# Patient Record
Sex: FEMALE | Race: WHITE | NOT HISPANIC OR LATINO | Employment: FULL TIME | URBAN - NONMETROPOLITAN AREA
[De-identification: names, ages, dates, MRNs, and addresses within clinical notes are randomized per-mention and may not be internally consistent; named-entity substitution may affect disease eponyms.]

---

## 2023-03-21 LAB — THYROTROPIN (MIU/L) IN SER/PLAS BY DETECTION LIMIT <= 0.05 MIU/L: 1.65 MIU/L (ref 0.44–3.98)

## 2023-03-23 PROBLEM — E55.9 VITAMIN D DEFICIENCY: Status: ACTIVE | Noted: 2023-03-23

## 2023-03-23 PROBLEM — C73 MALIGNANT NEOPLASM OF THYROID GLAND (MULTI): Status: ACTIVE | Noted: 2023-03-23

## 2023-03-23 PROBLEM — E78.00 HYPERCHOLESTEROLEMIA: Status: ACTIVE | Noted: 2023-03-23

## 2023-03-23 PROBLEM — F41.1 GAD (GENERALIZED ANXIETY DISORDER): Status: ACTIVE | Noted: 2023-03-23

## 2023-03-23 PROBLEM — R49.0 HOARSENESS OF VOICE: Status: ACTIVE | Noted: 2023-03-23

## 2023-03-23 PROBLEM — N95.1 MENOPAUSAL SYMPTOMS: Status: ACTIVE | Noted: 2023-03-23

## 2023-03-23 PROBLEM — K21.9 GERD WITHOUT ESOPHAGITIS: Status: ACTIVE | Noted: 2023-03-23

## 2023-03-23 PROBLEM — R92.30 DENSE BREAST TISSUE: Status: ACTIVE | Noted: 2023-03-23

## 2023-03-23 PROBLEM — E66.3 OVERWEIGHT WITH BODY MASS INDEX (BMI) OF 25 TO 25.9 IN ADULT: Status: ACTIVE | Noted: 2023-03-23

## 2023-03-23 PROBLEM — C44.310 FACIAL BASAL CELL CANCER: Status: ACTIVE | Noted: 2023-03-23

## 2023-03-23 PROBLEM — H69.93 DYSFUNCTION OF BOTH EUSTACHIAN TUBES: Status: ACTIVE | Noted: 2023-03-23

## 2023-03-23 PROBLEM — N89.8 PRURITUS OF VAGINA: Status: ACTIVE | Noted: 2023-03-23

## 2023-03-23 RX ORDER — ATORVASTATIN CALCIUM 20 MG/1
20 TABLET, FILM COATED ORAL DAILY
COMMUNITY
End: 2023-07-27 | Stop reason: ALTCHOICE

## 2023-03-23 RX ORDER — LEVOTHYROXINE SODIUM 125 UG/1
1 TABLET ORAL DAILY
COMMUNITY
Start: 2019-05-02 | End: 2023-11-08 | Stop reason: WASHOUT

## 2023-03-23 RX ORDER — CITALOPRAM 10 MG/1
1 TABLET ORAL DAILY
COMMUNITY
Start: 2018-11-27 | End: 2023-10-30 | Stop reason: SDUPTHER

## 2023-03-23 RX ORDER — OMEPRAZOLE 40 MG/1
1 CAPSULE, DELAYED RELEASE ORAL
COMMUNITY
Start: 2018-07-10 | End: 2023-07-28 | Stop reason: SDUPTHER

## 2023-03-23 RX ORDER — CHOLECALCIFEROL (VITAMIN D3) 50 MCG
2 TABLET ORAL DAILY
COMMUNITY
Start: 2020-11-03 | End: 2024-03-26 | Stop reason: WASHOUT

## 2023-03-24 ENCOUNTER — OFFICE VISIT (OUTPATIENT)
Dept: PRIMARY CARE | Facility: CLINIC | Age: 53
End: 2023-03-24
Payer: COMMERCIAL

## 2023-03-24 ENCOUNTER — LAB (OUTPATIENT)
Dept: LAB | Facility: LAB | Age: 53
End: 2023-03-24
Payer: COMMERCIAL

## 2023-03-24 VITALS
OXYGEN SATURATION: 94 % | RESPIRATION RATE: 18 BRPM | BODY MASS INDEX: 26.35 KG/M2 | HEIGHT: 62 IN | HEART RATE: 80 BPM | DIASTOLIC BLOOD PRESSURE: 104 MMHG | WEIGHT: 143.2 LBS | SYSTOLIC BLOOD PRESSURE: 162 MMHG

## 2023-03-24 DIAGNOSIS — R20.0 NUMBNESS OF LEFT HAND: ICD-10-CM

## 2023-03-24 DIAGNOSIS — R07.9 CHEST PAIN, UNSPECIFIED TYPE: ICD-10-CM

## 2023-03-24 DIAGNOSIS — I10 HYPERTENSION, UNSPECIFIED TYPE: ICD-10-CM

## 2023-03-24 DIAGNOSIS — R94.31 ABNORMAL EKG: ICD-10-CM

## 2023-03-24 LAB
ALANINE AMINOTRANSFERASE (SGPT) (U/L) IN SER/PLAS: 19 U/L (ref 7–45)
ALBUMIN (G/DL) IN SER/PLAS: 4.3 G/DL (ref 3.4–5)
ALKALINE PHOSPHATASE (U/L) IN SER/PLAS: 69 U/L (ref 33–110)
ANION GAP IN SER/PLAS: 9 MMOL/L (ref 10–20)
ASPARTATE AMINOTRANSFERASE (SGOT) (U/L) IN SER/PLAS: 20 U/L (ref 9–39)
BASOPHILS (10*3/UL) IN BLOOD BY AUTOMATED COUNT: 0.07 X10E9/L (ref 0–0.1)
BASOPHILS/100 LEUKOCYTES IN BLOOD BY AUTOMATED COUNT: 1.2 % (ref 0–2)
BILIRUBIN TOTAL (MG/DL) IN SER/PLAS: 0.5 MG/DL (ref 0–1.2)
CALCIUM (MG/DL) IN SER/PLAS: 8.9 MG/DL (ref 8.6–10.3)
CARBON DIOXIDE, TOTAL (MMOL/L) IN SER/PLAS: 31 MMOL/L (ref 21–32)
CHLORIDE (MMOL/L) IN SER/PLAS: 102 MMOL/L (ref 98–107)
CREATININE (MG/DL) IN SER/PLAS: 0.65 MG/DL (ref 0.5–1.05)
EOSINOPHILS (10*3/UL) IN BLOOD BY AUTOMATED COUNT: 0.06 X10E9/L (ref 0–0.7)
EOSINOPHILS/100 LEUKOCYTES IN BLOOD BY AUTOMATED COUNT: 1 % (ref 0–6)
ERYTHROCYTE DISTRIBUTION WIDTH (RATIO) BY AUTOMATED COUNT: 12.1 % (ref 11.5–14.5)
ERYTHROCYTE MEAN CORPUSCULAR HEMOGLOBIN CONCENTRATION (G/DL) BY AUTOMATED: 32.3 G/DL (ref 32–36)
ERYTHROCYTE MEAN CORPUSCULAR VOLUME (FL) BY AUTOMATED COUNT: 96 FL (ref 80–100)
ERYTHROCYTES (10*6/UL) IN BLOOD BY AUTOMATED COUNT: 4.53 X10E12/L (ref 4–5.2)
ESTRADIOL (PG/ML) IN SER/PLAS: 65 PG/ML
FOLLITROPIN (IU/L) IN SER/PLAS: 69.4 IU/L
GFR FEMALE: >90 ML/MIN/1.73M2
GLUCOSE (MG/DL) IN SER/PLAS: 97 MG/DL (ref 74–99)
HEMATOCRIT (%) IN BLOOD BY AUTOMATED COUNT: 43.3 % (ref 36–46)
HEMOGLOBIN (G/DL) IN BLOOD: 14 G/DL (ref 12–16)
IMMATURE GRANULOCYTES/100 LEUKOCYTES IN BLOOD BY AUTOMATED COUNT: 0.2 % (ref 0–0.9)
LEUKOCYTES (10*3/UL) IN BLOOD BY AUTOMATED COUNT: 5.9 X10E9/L (ref 4.4–11.3)
LYMPHOCYTES (10*3/UL) IN BLOOD BY AUTOMATED COUNT: 1.91 X10E9/L (ref 1.2–4.8)
LYMPHOCYTES/100 LEUKOCYTES IN BLOOD BY AUTOMATED COUNT: 32.4 % (ref 13–44)
MONOCYTES (10*3/UL) IN BLOOD BY AUTOMATED COUNT: 0.54 X10E9/L (ref 0.1–1)
MONOCYTES/100 LEUKOCYTES IN BLOOD BY AUTOMATED COUNT: 9.2 % (ref 2–10)
NEUTROPHILS (10*3/UL) IN BLOOD BY AUTOMATED COUNT: 3.3 X10E9/L (ref 1.2–7.7)
NEUTROPHILS/100 LEUKOCYTES IN BLOOD BY AUTOMATED COUNT: 56 % (ref 40–80)
PLATELETS (10*3/UL) IN BLOOD AUTOMATED COUNT: 341 X10E9/L (ref 150–450)
POTASSIUM (MMOL/L) IN SER/PLAS: 3.5 MMOL/L (ref 3.5–5.3)
PROTEIN TOTAL: 7 G/DL (ref 6.4–8.2)
SODIUM (MMOL/L) IN SER/PLAS: 138 MMOL/L (ref 136–145)
TROPONIN I, HIGH SENSITIVITY: 5 NG/L (ref 0–13)
UREA NITROGEN (MG/DL) IN SER/PLAS: 9 MG/DL (ref 6–23)

## 2023-03-24 PROCEDURE — 93000 ELECTROCARDIOGRAM COMPLETE: CPT | Performed by: INTERNAL MEDICINE

## 2023-03-24 PROCEDURE — 3080F DIAST BP >= 90 MM HG: CPT | Performed by: INTERNAL MEDICINE

## 2023-03-24 PROCEDURE — 3077F SYST BP >= 140 MM HG: CPT | Performed by: INTERNAL MEDICINE

## 2023-03-24 PROCEDURE — 36415 COLL VENOUS BLD VENIPUNCTURE: CPT

## 2023-03-24 PROCEDURE — 85025 COMPLETE CBC W/AUTO DIFF WBC: CPT

## 2023-03-24 PROCEDURE — 1036F TOBACCO NON-USER: CPT | Performed by: INTERNAL MEDICINE

## 2023-03-24 PROCEDURE — 82607 VITAMIN B-12: CPT

## 2023-03-24 PROCEDURE — 80053 COMPREHEN METABOLIC PANEL: CPT

## 2023-03-24 PROCEDURE — 99214 OFFICE O/P EST MOD 30 MIN: CPT | Performed by: INTERNAL MEDICINE

## 2023-03-24 PROCEDURE — 84484 ASSAY OF TROPONIN QUANT: CPT

## 2023-03-24 RX ORDER — CARVEDILOL 12.5 MG/1
12.5 TABLET ORAL
Qty: 180 TABLET | Refills: 0 | Status: SHIPPED | OUTPATIENT
Start: 2023-03-24 | End: 2023-07-03 | Stop reason: SDUPTHER

## 2023-03-24 ASSESSMENT — PAIN SCALES - GENERAL: PAINLEVEL: 0-NO PAIN

## 2023-03-24 NOTE — PROGRESS NOTES
Subjective   Patient ID:   Jessica Francois is a 52 y.o. female who presents to the office today for Hypertension (BP was high yesterday at GYN visit, has been having frequent headaches, hot flashes that also cause red spots on knees that looked like when you get sunburn, trouble sleeping, wakes up with hands feeling numb, feels pressure in neck).  - over the past week she had squeezing in her neck, flushing, headaches, dizziness  - thought maybe she was going trough menopause or that her thyroid was off  - her blood pressure was elevated yesterday at GYN yesterday and she advised for her to come to PCP  - numb fingers on the left side when she wakes up   - had a stress test a long time ago and it was -ve. This was ordered due to chest pain on and off.   - has some engorgement of left neck   - had thyroidectomy in 2019.   - advised to start coreg 12.5mg BID  - get CT of neck     Review of Systems  12 point review of systems negative unless stated above in HPI    Vitals:    03/24/23 1128   BP: (!) 162/104   Pulse: 80   Resp: 18   SpO2: 94%     Current Outpatient Medications on File Prior to Visit   Medication Sig Dispense Refill   • cholecalciferol (Vitamin D-3) 50 MCG (2000 UT) tablet Take 2 tablets (100 mcg) by mouth once daily.     • citalopram (CeleXA) 10 mg tablet Take 1 tablet (10 mg) by mouth once daily.     • levothyroxine (Synthroid, Levoxyl) 125 mcg tablet Take 1 tablet (125 mcg) by mouth once daily.     • atorvastatin (Lipitor) 20 mg tablet Take 1 tablet (20 mg) by mouth once daily.     • omeprazole (PriLOSEC) 40 mg DR capsule Take 1 capsule (40 mg) by mouth once daily in the evening.  Take before meals. 30 min before dinner       No current facility-administered medications on file prior to visit.      Physical Exam  General: Alert and oriented, well nourished, no acute distress.  Lungs: Clear to auscultation, non-labored respiration.  Heart: Normal rate, regular rhythm, no murmur, gallop or  edema.  Neurologic: Awake, alert, and oriented X3, CN II-XII intact.  Psychiatric: Cooperative, appropriate mood and affect.    Assessment/Plan   Chest Tightness, most likely 2/2 Uncontrolled Hypertension   - BP was high yesterday at GYN visit, has been having frequent headaches, hot flashes that also cause red spots on knees that looked like when you get sunburn, trouble sleeping, wakes up with hands feeling numb, feels pressure in neck over the past week she had squeezing in her neck, flushing, headaches, dizziness  - thought maybe she was going trough menopause or that her thyroid was off  - numb fingers on the left side when she wakes up   - had a stress test a long time ago and it was -ve. This was ordered due to chest pain on and off.   - has some engorgement of left neck   - had thyroidectomy in 2019.   - advised to start coreg 12.5mg BID for the bp  - check troponin, CBC, CMP, vitamin b12  - get CT of neck to r/o anything acute  - we will call you with the results  - if anything worsens, ER  ------  Written by Caity Gaspar LPN, acting as a scribe for Dr. Chapa. This note accurately reflects the work and decisions made by Dr. Chapa.     I, Dr. Chapa, attest all medical record entries made by the scribe were under my direction and were personally dictated by me. I have reviewed the chart and agree that the record accurately reflects my performance of the history, physical exam, and assessment and plan.

## 2023-03-25 LAB — COBALAMIN (VITAMIN B12) (PG/ML) IN SER/PLAS: 527 PG/ML (ref 211–911)

## 2023-05-10 DIAGNOSIS — J34.9 SINUS PROBLEM: ICD-10-CM

## 2023-05-10 RX ORDER — AMOXICILLIN AND CLAVULANATE POTASSIUM 875; 125 MG/1; MG/1
875 TABLET, FILM COATED ORAL 2 TIMES DAILY
Qty: 20 TABLET | Refills: 0 | Status: SHIPPED | OUTPATIENT
Start: 2023-05-10 | End: 2023-05-20

## 2023-06-07 ENCOUNTER — TELEPHONE (OUTPATIENT)
Dept: PRIMARY CARE | Facility: CLINIC | Age: 53
End: 2023-06-07
Payer: COMMERCIAL

## 2023-06-07 DIAGNOSIS — R07.81 RIB PAIN: ICD-10-CM

## 2023-06-22 LAB — THYROTROPIN (MIU/L) IN SER/PLAS BY DETECTION LIMIT <= 0.05 MIU/L: 3.51 MIU/L (ref 0.44–3.98)

## 2023-06-26 LAB
THYROGLOBULIN AB (IU/ML) IN SER/PLAS: <0.9 IU/ML (ref 0–4)
THYROGLOBULIN LC-MS/MS: ABNORMAL NG/ML (ref 1.3–31.8)
THYROGLOBULIN: 0.2 NG/ML (ref 1.3–31.8)

## 2023-07-03 DIAGNOSIS — I10 HYPERTENSION, UNSPECIFIED TYPE: ICD-10-CM

## 2023-07-03 RX ORDER — CARVEDILOL 12.5 MG/1
12.5 TABLET ORAL
Qty: 180 TABLET | Refills: 0 | Status: SHIPPED | OUTPATIENT
Start: 2023-07-03 | End: 2023-09-25 | Stop reason: SDUPTHER

## 2023-07-28 ENCOUNTER — OFFICE VISIT (OUTPATIENT)
Dept: PRIMARY CARE | Facility: CLINIC | Age: 53
End: 2023-07-28
Payer: COMMERCIAL

## 2023-07-28 ENCOUNTER — APPOINTMENT (OUTPATIENT)
Dept: PRIMARY CARE | Facility: CLINIC | Age: 53
End: 2023-07-28
Payer: COMMERCIAL

## 2023-07-28 VITALS
HEIGHT: 62 IN | WEIGHT: 143 LBS | RESPIRATION RATE: 18 BRPM | SYSTOLIC BLOOD PRESSURE: 145 MMHG | DIASTOLIC BLOOD PRESSURE: 90 MMHG | HEART RATE: 64 BPM | OXYGEN SATURATION: 97 % | BODY MASS INDEX: 26.31 KG/M2

## 2023-07-28 DIAGNOSIS — K21.9 GERD WITHOUT ESOPHAGITIS: ICD-10-CM

## 2023-07-28 DIAGNOSIS — M54.10 BACK PAIN WITH RADICULOPATHY: Primary | ICD-10-CM

## 2023-07-28 PROCEDURE — 1036F TOBACCO NON-USER: CPT | Performed by: INTERNAL MEDICINE

## 2023-07-28 PROCEDURE — 99213 OFFICE O/P EST LOW 20 MIN: CPT | Performed by: INTERNAL MEDICINE

## 2023-07-28 RX ORDER — OMEPRAZOLE 40 MG/1
40 CAPSULE, DELAYED RELEASE ORAL
Qty: 90 CAPSULE | Refills: 0 | Status: SHIPPED | OUTPATIENT
Start: 2023-07-28 | End: 2023-07-28

## 2023-07-28 RX ORDER — MELOXICAM 15 MG/1
15 TABLET ORAL DAILY
Qty: 7 TABLET | Refills: 0 | Status: SHIPPED | OUTPATIENT
Start: 2023-07-28 | End: 2023-07-28

## 2023-07-28 RX ORDER — PREDNISONE 20 MG/1
40 TABLET ORAL DAILY
Qty: 10 TABLET | Refills: 0 | Status: SHIPPED | OUTPATIENT
Start: 2023-07-28 | End: 2023-07-28

## 2023-07-28 ASSESSMENT — PAIN SCALES - GENERAL: PAINLEVEL: 8

## 2023-07-28 ASSESSMENT — ENCOUNTER SYMPTOMS
BACK PAIN: 1
LEG PAIN: 1
NUMBNESS: 1

## 2023-07-28 NOTE — PROGRESS NOTES
Patient ID:   Jessica Francois is a 53 y.o. female with PMH remarkable for HTN, GERD, depression/anxiety, hypothyroidism, vitamin d deficiency who presents to the office today for Back Pain (Mostly right side but goes whole back, pain x 1 month, sharp pain in feet, flared up after slept on bad mattress, hx of bulging lumbar discs, ).    Back Pain  This is a recurrent problem. The current episode started more than 1 month ago. The problem occurs constantly. The problem has been gradually worsening since onset. The pain is present in the lumbar spine and sacro-iliac. The quality of the pain is described as aching and shooting. The pain radiates to the right foot and left foot. The pain is at a severity of 5/10. The pain is moderate. The pain is Worse during the day. The symptoms are aggravated by bending, position, twisting and lying down. Stiffness is present All day. Associated symptoms include leg pain and numbness. The treatment provided mild relief.     - start on prednisone 40mg every day x5d  - start on meloxicam 15mg every day x7d  - if no imp, will refer to PT and get imaging    REVIEW OF SYSTEMS:  Review of Systems   Musculoskeletal:  Positive for back pain.   Neurological:  Positive for numbness.   All other systems reviewed and are negative.    12 point review of systems negative unless stated above in HPI    VITAL SIGNS:  Vitals:    07/28/23 0943   BP: 145/90   Pulse: 64   Resp: 18   SpO2: 97%       ALLERGIES:  No Known Allergies     PHYSICAL EXAM:  Physical Exam  Vitals reviewed.   Constitutional:       General: She is not in acute distress.     Appearance: Normal appearance. She is not ill-appearing.   HENT:      Head: Normocephalic and atraumatic.      Right Ear: Tympanic membrane and external ear normal.      Left Ear: Tympanic membrane and external ear normal.      Nose: Nose normal.      Mouth/Throat:      Mouth: Mucous membranes are moist.      Pharynx: Oropharynx is clear.   Eyes:       Conjunctiva/sclera: Conjunctivae normal.      Pupils: Pupils are equal, round, and reactive to light.   Cardiovascular:      Rate and Rhythm: Normal rate and regular rhythm.      Heart sounds: Normal heart sounds. No murmur heard.  Pulmonary:      Effort: Pulmonary effort is normal. No respiratory distress.      Breath sounds: Normal breath sounds. No wheezing.   Abdominal:      General: There is no distension.      Palpations: Abdomen is soft. There is no mass.      Tenderness: There is no abdominal tenderness.   Musculoskeletal:         General: Normal range of motion.      Cervical back: Normal range of motion and neck supple.      Lumbar back: Tenderness present.   Skin:     General: Skin is warm and dry.   Neurological:      General: No focal deficit present.      Mental Status: She is alert and oriented to person, place, and time.      Sensory: No sensory deficit.      Motor: No weakness.      Coordination: Coordination normal.      Gait: Gait normal.   Psychiatric:         Mood and Affect: Mood normal.         Behavior: Behavior normal.         MEDICATIONS:  Current Outpatient Medications on File Prior to Visit   Medication Sig Dispense Refill    carvedilol (Coreg) 12.5 mg tablet Take 1 tablet (12.5 mg) by mouth 2 times a day with meals. 180 tablet 0    cholecalciferol (Vitamin D-3) 50 MCG (2000 UT) tablet Take 2 tablets (100 mcg) by mouth once daily.      citalopram (CeleXA) 10 mg tablet Take 1 tablet (10 mg) by mouth once daily.      levothyroxine (Synthroid, Levoxyl) 125 mcg tablet Take 1 tablet (125 mcg) by mouth once daily.      omeprazole (PriLOSEC) 40 mg DR capsule Take 1 capsule (40 mg) by mouth once daily in the evening.  Take before meals. 30 min before dinner      [DISCONTINUED] atorvastatin (Lipitor) 20 mg tablet Take 1 tablet (20 mg) by mouth once daily.       No current facility-administered medications on file prior to visit.        Your medication list            Accurate as of July 28, 2023  10:31 AM. If you have any questions, ask your nurse or doctor.                START taking these medications        Instructions Last Dose Given Next Dose Due   meloxicam 15 mg tablet  Commonly known as: Mobic  Started by: Bessie Pritchard MD      Take 1 tablet (15 mg) by mouth once daily for 7 days.       predniSONE 20 mg tablet  Commonly known as: Deltasone  Started by: Bessie Pritchard MD      Take 2 tablets (40 mg) by mouth once daily for 5 days. Take in AM.              CONTINUE taking these medications        Instructions Last Dose Given Next Dose Due   carvedilol 12.5 mg tablet  Commonly known as: Coreg      Take 1 tablet (12.5 mg) by mouth 2 times a day with meals.       cholecalciferol 50 MCG (2000 UT) tablet  Commonly known as: Vitamin D-3           citalopram 10 mg tablet  Commonly known as: CeleXA           levothyroxine 125 mcg tablet  Commonly known as: Synthroid, Levoxyl           omeprazole 40 mg DR capsule  Commonly known as: PriLOSEC                     Where to Get Your Medications        These medications were sent to [facility] [address] [address]      Phone: [phone]   meloxicam 15 mg tablet  predniSONE 20 mg tablet         RECENT LABS:  Lab Results   Component Value Date    WBC 5.9 03/24/2023    HGB 14.0 03/24/2023    HCT 43.3 03/24/2023     03/24/2023    CHOL 289 (H) 11/01/2022    TRIG 166 (H) 11/01/2022    HDL 80.0 11/01/2022    ALT 19 03/24/2023    AST 20 03/24/2023     03/24/2023    K 3.5 03/24/2023     03/24/2023    CREATININE 0.65 03/24/2023    BUN 9 03/24/2023    CO2 31 03/24/2023    TSH 3.51 06/22/2023    INR 0.9 04/15/2019    HGBA1C 4.8 10/10/2019       ASSESSMENT AND PLAN:  Assessment/Plan   Diagnoses and all orders for this visit:  Back pain with radiculopathy  -     predniSONE (Deltasone) 20 mg tablet; Take 2 tablets (40 mg) by mouth once daily for 5 days. Take in AM.  -     meloxicam (Mobic) 15 mg  tablet; Take 1 tablet (15 mg) by mouth once daily for 7 days.      ------  Written by Caity Gaspar RN, acting as a scribe for Dr. Chapa. This note accurately reflects the work and decisions made by Dr. Chapa.     I, Dr. Chapa, attest all medical record entries made by the scribe were under my direction and were personally dictated by me. I have reviewed the chart and agree that the record accurately reflects my performance of the history, physical exam, and assessment and plan.

## 2023-09-25 DIAGNOSIS — I10 HYPERTENSION, UNSPECIFIED TYPE: ICD-10-CM

## 2023-09-25 RX ORDER — CARVEDILOL 12.5 MG/1
12.5 TABLET ORAL
Qty: 180 TABLET | Refills: 0 | Status: SHIPPED | OUTPATIENT
Start: 2023-09-25 | End: 2023-09-25

## 2023-10-30 DIAGNOSIS — F41.1 GAD (GENERALIZED ANXIETY DISORDER): ICD-10-CM

## 2023-10-31 ENCOUNTER — PHARMACY VISIT (OUTPATIENT)
Dept: PHARMACY | Facility: CLINIC | Age: 53
End: 2023-10-31
Payer: COMMERCIAL

## 2023-10-31 PROCEDURE — RXMED WILLOW AMBULATORY MEDICATION CHARGE

## 2023-10-31 RX ORDER — CITALOPRAM 10 MG/1
10 TABLET ORAL DAILY
Qty: 90 TABLET | Refills: 0 | Status: SHIPPED | OUTPATIENT
Start: 2023-10-31 | End: 2024-01-22 | Stop reason: SDUPTHER

## 2023-11-01 DIAGNOSIS — K21.9 GERD WITHOUT ESOPHAGITIS: ICD-10-CM

## 2023-11-01 RX ORDER — OMEPRAZOLE 40 MG/1
CAPSULE, DELAYED RELEASE ORAL
Qty: 90 CAPSULE | Refills: 0 | Status: CANCELLED | OUTPATIENT
Start: 2023-11-01 | End: 2024-10-31

## 2023-11-02 DIAGNOSIS — K21.9 GERD WITHOUT ESOPHAGITIS: ICD-10-CM

## 2023-11-02 RX ORDER — OMEPRAZOLE 40 MG/1
CAPSULE, DELAYED RELEASE ORAL
Qty: 90 CAPSULE | Refills: 0 | Status: SHIPPED | OUTPATIENT
Start: 2023-11-02 | End: 2024-03-26 | Stop reason: WASHOUT

## 2023-11-07 ENCOUNTER — HOSPITAL ENCOUNTER (OUTPATIENT)
Dept: RADIOLOGY | Facility: HOSPITAL | Age: 53
Discharge: HOME | End: 2023-11-07
Payer: COMMERCIAL

## 2023-11-07 DIAGNOSIS — Z12.31 SCREENING MAMMOGRAM FOR BREAST CANCER: ICD-10-CM

## 2023-11-07 PROCEDURE — 77063 BREAST TOMOSYNTHESIS BI: CPT

## 2023-11-07 PROCEDURE — 77067 SCR MAMMO BI INCL CAD: CPT

## 2023-11-07 PROCEDURE — 77063 BREAST TOMOSYNTHESIS BI: CPT | Performed by: RADIOLOGY

## 2023-11-07 PROCEDURE — 77067 SCR MAMMO BI INCL CAD: CPT | Performed by: RADIOLOGY

## 2023-11-08 ENCOUNTER — OFFICE VISIT (OUTPATIENT)
Dept: DERMATOLOGY | Facility: CLINIC | Age: 53
End: 2023-11-08
Payer: COMMERCIAL

## 2023-11-08 DIAGNOSIS — L82.1 SEBORRHEIC KERATOSIS: ICD-10-CM

## 2023-11-08 DIAGNOSIS — Z85.828 HISTORY OF NONMELANOMA SKIN CANCER: ICD-10-CM

## 2023-11-08 DIAGNOSIS — D18.01 ANGIOMA OF SKIN: ICD-10-CM

## 2023-11-08 DIAGNOSIS — L90.5 SCAR CONDITIONS AND FIBROSIS OF SKIN: ICD-10-CM

## 2023-11-08 DIAGNOSIS — L81.4 LENTIGO: ICD-10-CM

## 2023-11-08 DIAGNOSIS — D22.9 BENIGN NEVUS: Primary | ICD-10-CM

## 2023-11-08 DIAGNOSIS — L57.9 SKIN CHANGES DUE TO CHRONIC EXPOSURE TO NONIONIZING RADIATION: ICD-10-CM

## 2023-11-08 PROCEDURE — 1036F TOBACCO NON-USER: CPT | Performed by: NURSE PRACTITIONER

## 2023-11-08 PROCEDURE — 99213 OFFICE O/P EST LOW 20 MIN: CPT | Performed by: NURSE PRACTITIONER

## 2023-11-08 ASSESSMENT — DERMATOLOGY QUALITY OF LIFE (QOL) ASSESSMENT
WHAT SINGLE SKIN CONDITION LISTED BELOW IS THE PATIENT ANSWERING THE QUALITY-OF-LIFE ASSESSMENT QUESTIONS ABOUT: NONE OF THE ABOVE
RATE HOW EMOTIONALLY BOTHERED YOU ARE BY YOUR SKIN PROBLEM (FOR EXAMPLE, WORRY, EMBARRASSMENT, FRUSTRATION): 0 - NEVER BOTHERED
RATE HOW BOTHERED YOU ARE BY SYMPTOMS OF YOUR SKIN PROBLEM (EG, ITCHING, STINGING BURNING, HURTING OR SKIN IRRITATION): 0 - NEVER BOTHERED
RATE HOW BOTHERED YOU ARE BY EFFECTS OF YOUR SKIN PROBLEMS ON YOUR ACTIVITIES (EG, GOING OUT, ACCOMPLISHING WHAT YOU WANT, WORK ACTIVITIES OR YOUR RELATIONSHIPS WITH OTHERS): 0 - NEVER BOTHERED
RATE HOW BOTHERED YOU ARE BY EFFECTS OF YOUR SKIN PROBLEMS ON YOUR ACTIVITIES (EG, GOING OUT, ACCOMPLISHING WHAT YOU WANT, WORK ACTIVITIES OR YOUR RELATIONSHIPS WITH OTHERS): 0 - NEVER BOTHERED
RATE HOW EMOTIONALLY BOTHERED YOU ARE BY YOUR SKIN PROBLEM (FOR EXAMPLE, WORRY, EMBARRASSMENT, FRUSTRATION): 0 - NEVER BOTHERED
RATE HOW BOTHERED YOU ARE BY SYMPTOMS OF YOUR SKIN PROBLEM (EG, ITCHING, STINGING BURNING, HURTING OR SKIN IRRITATION): 0 - NEVER BOTHERED
WHAT SINGLE SKIN CONDITION LISTED BELOW IS THE PATIENT ANSWERING THE QUALITY-OF-LIFE ASSESSMENT QUESTIONS ABOUT: NONE OF THE ABOVE

## 2023-11-08 ASSESSMENT — PATIENT GLOBAL ASSESSMENT (PGA): WHAT IS THE PGA: PATIENT GLOBAL ASSESSMENT:  1 - CLEAR

## 2023-11-08 NOTE — PATIENT INSTRUCTIONS

## 2023-11-08 NOTE — PROGRESS NOTES
Subjective     Jessica Francois is a 53 y.o. female who presents for the following: Skin Check.     Review of Systems:  No other skin or systemic complaints other than what is documented elsewhere in the note.    The following portions of the chart were reviewed this encounter and updated as appropriate:  Tobacco  Allergies  Meds  Problems  Med Hx  Surg Hx         Skin Cancer History  No skin cancer on file.      Specialty Problems          Dermatology Problems    Facial basal cell cancer        Objective   Well appearing patient in no apparent distress; mood and affect are within normal limits.    A full examination was performed including scalp, head, eyes, ears, nose, lips, neck, chest, axillae, abdomen, back, buttocks, bilateral upper extremities, bilateral lower extremities, hands, feet, fingers, toes, fingernails, and toenails. All findings within normal limits unless otherwise noted below.    Assessment/Plan   1. Benign nevus  Scattered, uniform and benign-appearing, regular brown melanocytic papules and macules.    Right upper lip has a 3 x 4.5 mm uniform tan brown macule with reticular fingerprint pattern and some brown dots. No other features noted. No vascular patterns noted.     The present appearance of the lesion is not worrisome but it should continue to be observed and testing/treatment may be warranted if change occurs.    No suspicious features noted. Advised to monitor and return to clinic if change is noted.     Related Procedures  Follow Up In Dermatology - Established Patient    2. Scar conditions and fibrosis of skin  Head - Anterior (Face)  Well healed scar at the site(s) of prior treatment with no evidence of recurrence.          The scar is clear, there is no evidence of recurrence.  The present appearance of the scar is not worrisome but it should continue to be observed and testing/treatment may be warranted if change occurs.      Related Procedures  Follow Up In Dermatology -  Established Patient    3. Angioma of skin  Scattered cherry-red papule(s).    A cherry hemangioma is a small macule (small, flat, smooth area) or papule (small, solid bump) formed from an overgrowth of tiny blood vessels in the skin. Cherry hemangiomas are characteristically red or purplish in color. They often first appear in middle adulthood and usually increase in number with age. Cherry hemangiomas are noncancerous (benign) and are common in adults.    The present appearance of the lesion is not worrisome but it should continue to be observed and testing/treatment may be warranted if change occurs.    Related Procedures  Follow Up In Dermatology - Established Patient    4. Seborrheic keratosis  Stuck on verrucous, tan-brown papules and plaques.      Seborrheic keratoses are common noncancerous (benign) growths of unknown cause seen in adults due to a thickening of an area of the top skin layer. Seborrheic keratoses may appear as if they are stuck on to the skin. They have distinct borders, and they may appear as papules (small, solid bumps) or plaques (solid, raised patches that are bigger than a thumbnail). They may be the same color as your skin, or they may be pink, light brown, darker brown, or very dark brown, or sometimes may appear black.    There is no way to prevent new seborrheic keratoses from forming. Seborrheic keratoses can be removed, but removal is considered a cosmetic issue and is usually not covered by insurance.    PLAN  No treatment is needed unless there is irritation from clothing, such as itching or bleeding.  2.   Some lotions containing alpha hydroxy acids, salicylic acid, or urea may make the areas feel smoother with regular use but will not eliminate them.    Related Procedures  Follow Up In Dermatology - Established Patient    5. Lentigo  Scattered tan macules in sun-exposed areas.    A solar lentigo (plural, solar lentigines), also known as a sun-induced freckle or senile lentigo,  is a dark (hyperpigmented) lesion caused by natural or artificial ultraviolet (UV) light. Solar lentigines may be single or multiple. This type of lentigo is different from a simple lentigo (lentigo simplex) because it is caused by exposure to UV light. Solar lentigines are benign, but they do indicate excessive sun exposure, a risk factor for the development of skin cancer.    To prevent solar lentigines, avoid exposure to sunlight in midday (10 AM to 3 PM), wear sun-protective clothing (tightly woven clothes and hats), and apply sunscreen (SPF 30 UVA and UVB block).    The present appearance of the lesion is not worrisome but it should continue to be observed and testing/treatment may be warranted if change occurs.    Related Procedures  Follow Up In Dermatology - Established Patient    6. History of nonmelanoma skin cancer    ABCDEs of melanoma and atypical moles were discussed with the patient.    Patient was instructed to perform monthly self skin examination.  We recommended that the patient have regular full skin exams given an increased risk of subsequent skin cancers.    The patient was instructed to use sun protective behaviors including use of broad spectrum sunscreens and sun protective clothing to reduce risk of skin cancers.    Warning signs of non-melanoma skin cancer discussed.    Related Procedures  Follow Up In Dermatology - Established Patient    7. Skin changes due to chronic exposure to nonionizing radiation  Actinic changes in the form of freckles, lentigines and hyper/hypopigmentation     ABCDEs of melanoma and atypical moles were discussed with the patient.    Patient was instructed to perform monthly self skin examination.  We recommended that the patient have regular full skin exams given an increased risk of subsequent skin cancers.    The patient was instructed to use sun protective behaviors including use of broad spectrum sunscreens and sun protective clothing to reduce risk of skin  cancers.    Warning signs of non-melanoma skin cancer discussed.    Related Procedures  Follow Up In Dermatology - Established Patient

## 2023-12-04 ENCOUNTER — LAB (OUTPATIENT)
Dept: LAB | Facility: LAB | Age: 53
End: 2023-12-04
Payer: COMMERCIAL

## 2023-12-04 ENCOUNTER — OFFICE VISIT (OUTPATIENT)
Dept: ENDOCRINOLOGY | Facility: CLINIC | Age: 53
End: 2023-12-04
Payer: COMMERCIAL

## 2023-12-04 VITALS — BODY MASS INDEX: 25.06 KG/M2 | WEIGHT: 137 LBS

## 2023-12-04 DIAGNOSIS — E55.9 VITAMIN D DEFICIENCY: ICD-10-CM

## 2023-12-04 DIAGNOSIS — C73 MALIGNANT NEOPLASM OF THYROID GLAND (MULTI): Primary | ICD-10-CM

## 2023-12-04 DIAGNOSIS — C73 MALIGNANT NEOPLASM OF THYROID GLAND (MULTI): ICD-10-CM

## 2023-12-04 LAB — TSH SERPL-ACNC: 0.32 MIU/L (ref 0.44–3.98)

## 2023-12-04 PROCEDURE — 1036F TOBACCO NON-USER: CPT | Performed by: INTERNAL MEDICINE

## 2023-12-04 PROCEDURE — 82306 VITAMIN D 25 HYDROXY: CPT

## 2023-12-04 PROCEDURE — 36415 COLL VENOUS BLD VENIPUNCTURE: CPT

## 2023-12-04 PROCEDURE — 99213 OFFICE O/P EST LOW 20 MIN: CPT | Performed by: INTERNAL MEDICINE

## 2023-12-04 PROCEDURE — 84443 ASSAY THYROID STIM HORMONE: CPT

## 2023-12-04 NOTE — PATIENT INSTRUCTIONS
RECOMMENDATIONS  Draw TSH, 25-OH vitamin D  Results available on [facility]  Call/message if you have not received results in 3 days.     Take levothyroxine on an empty stomach with water alone, 1 hour before eating or taking other medications, 4 hours before any calcium or iron supplement.    Follow up 6 months  Draw TSH, thyroglobulin, 25-OH vitamin D at least 1 week before next appointment

## 2023-12-04 NOTE — PROGRESS NOTES
History Of Present Illness  Jessica Francois is a 53 y.o. female with a history of thyroid cancer.     Levothyroxine 125 mcg/day  Patient is taking levothyroxine on an empty stomach with water alone.    Thyroid cancer diagnosis date: 19   Type: Papillary  Size of primary tumor: 0.3 cm, isthmus  Extrathyroidal extension (ETE): Negative  Lymph Nodes: 0/1   Distant Metastases: None known   Pathologic Staging: pT1a N0 Mx.      Surgery: Thyroidectomy (19)          Past Medical History  She has a past medical history of Allergic contact dermatitis due to plants, except food (08/15/2017), Anxiety disorder, unspecified (2013), Cellulitis, unspecified (2016), Complete or unspecified spontaneous  without complication, Encounter for screening mammogram for malignant neoplasm of breast (2017), Influenza due to other identified influenza virus with other respiratory manifestations (2017), Mild cervical dysplasia, Other mucopurulent conjunctivitis, unspecified eye (10/08/2014), Other specified postprocedural states, Papillomavirus as the cause of diseases classified elsewhere, Personal history of other complications of pregnancy, childbirth and the puerperium, Personal history of other diseases of the nervous system and sense organs (2014), Personal history of other endocrine, nutritional and metabolic disease (10/07/2019), Personal history of other infectious and parasitic diseases, Personal history of other malignant neoplasm of skin, Personal history of other specified conditions (2014), and Personal history of other specified conditions (2013).    Surgical History  She has a past surgical history that includes Cholecystectomy (2013); Tonsillectomy (2017); Hysteroscopy (2017); Cervical biopsy w/ loop electrode excision (2017); Laparoscopy diagnostic / biopsy / aspiration / lysis (2017); Colonoscopy (2017); Other surgical history  (2017);  section, classic (2017); CT angio neck (2017); CT angio head w and wo IV contrast (2017); and Breast biopsy (Left).     Social History  She reports that she has quit smoking. Her smoking use included cigarettes. She has never used smokeless tobacco. She reports current alcohol use. She reports that she does not use drugs.    Family History  Family History   Problem Relation Name Age of Onset    Diabetes Mother      Thyroid disease Mother      Heart disease Father      Diabetes Father      Bone cancer Father      Hyperthyroidism Brother      Thyroid cancer Cousin         Medications  Current Outpatient Medications   Medication Instructions    carvedilol (Coreg) 12.5 mg tablet TAKE 1 TABLET (12.5 MG) BY MOUTH 2 TIMES A DAY WITH MEALS.    cholecalciferol (Vitamin D-3) 50 MCG ( UT) tablet 2 tablets, oral, Daily    citalopram (CELEXA) 10 mg, oral, Daily    levothyroxine (Synthroid, Levoxyl) 125 mcg tablet TAKE 1 TABLET BY MOUTH ONCE DAILY    omeprazole (PriLOSEC) 40 mg DR capsule TAKE 1 CAPSULE (40 MG) BY MOUTH ONCE DAILY IN THE EVENING. TAKE BEFORE MEALS. 30 MIN BEFORE DINNER       Allergies  Patient has no known allergies.        Last Recorded Vitals  Weight 62.1 kg (137 lb).    Physical Exam  Constitutional:       General: She is not in acute distress.  HENT:      Head: Normocephalic.   Eyes:      Extraocular Movements: Extraocular movements intact.   Neurological:      Mental Status: She is alert.   Psychiatric:         Mood and Affect: Affect normal.          Relevant Results  Lab Results   Component Value Date    TSH 3.51 2023    FREET4 0.75 (L) 2018    THYROGLOBU 0.2 (L) 2023    THYROGLCMSMS Not Applicable 2023    THYROGLOBULI <0.9 2023       IMPRESSION  PAPILLARY THYROID CARCINOMA    VITAMIN D DEFICIENCY      RECOMMENDATIONS  Draw TSH, 25-OH vitamin D  Results available on [facility]  Call/message if you have not received results in 3 days.      Take levothyroxine on an empty stomach with water alone, 1 hour before eating or taking other medications, 4 hours before any calcium or iron supplement.    Follow up 6 months  Draw TSH, thyroglobulin, 25-OH vitamin D at least 1 week before next appointment

## 2023-12-05 LAB — 25(OH)D3 SERPL-MCNC: 29 NG/ML (ref 30–100)

## 2023-12-18 ENCOUNTER — LAB (OUTPATIENT)
Dept: LAB | Facility: LAB | Age: 53
End: 2023-12-18
Payer: COMMERCIAL

## 2023-12-18 ENCOUNTER — PHARMACY VISIT (OUTPATIENT)
Dept: PHARMACY | Facility: CLINIC | Age: 53
End: 2023-12-18
Payer: COMMERCIAL

## 2023-12-18 DIAGNOSIS — I10 HYPERTENSION, UNSPECIFIED TYPE: ICD-10-CM

## 2023-12-18 LAB
ALBUMIN SERPL BCP-MCNC: 4.2 G/DL (ref 3.4–5)
ALP SERPL-CCNC: 82 U/L (ref 33–110)
ALT SERPL W P-5'-P-CCNC: 14 U/L (ref 7–45)
ANION GAP SERPL CALC-SCNC: 12 MMOL/L (ref 10–20)
AST SERPL W P-5'-P-CCNC: 17 U/L (ref 9–39)
BASOPHILS # BLD AUTO: 0.04 X10*3/UL (ref 0–0.1)
BASOPHILS NFR BLD AUTO: 0.7 %
BILIRUB SERPL-MCNC: 0.3 MG/DL (ref 0–1.2)
BUN SERPL-MCNC: 15 MG/DL (ref 6–23)
CALCIUM SERPL-MCNC: 8.8 MG/DL (ref 8.6–10.3)
CARDIAC TROPONIN I PNL SERPL HS: 4 NG/L (ref 0–13)
CHLORIDE SERPL-SCNC: 103 MMOL/L (ref 98–107)
CO2 SERPL-SCNC: 27 MMOL/L (ref 21–32)
CREAT SERPL-MCNC: 0.7 MG/DL (ref 0.5–1.05)
EOSINOPHIL # BLD AUTO: 0.2 X10*3/UL (ref 0–0.7)
EOSINOPHIL NFR BLD AUTO: 3.4 %
ERYTHROCYTE [DISTWIDTH] IN BLOOD BY AUTOMATED COUNT: 12 % (ref 11.5–14.5)
GFR SERPL CREATININE-BSD FRML MDRD: >90 ML/MIN/1.73M*2
GLUCOSE SERPL-MCNC: 98 MG/DL (ref 74–99)
HCT VFR BLD AUTO: 41.7 % (ref 36–46)
HGB BLD-MCNC: 13.3 G/DL (ref 12–16)
IMM GRANULOCYTES # BLD AUTO: 0.01 X10*3/UL (ref 0–0.7)
IMM GRANULOCYTES NFR BLD AUTO: 0.2 % (ref 0–0.9)
LYMPHOCYTES # BLD AUTO: 2.48 X10*3/UL (ref 1.2–4.8)
LYMPHOCYTES NFR BLD AUTO: 41.8 %
MCH RBC QN AUTO: 31 PG (ref 26–34)
MCHC RBC AUTO-ENTMCNC: 31.9 G/DL (ref 32–36)
MCV RBC AUTO: 97 FL (ref 80–100)
MONOCYTES # BLD AUTO: 0.48 X10*3/UL (ref 0.1–1)
MONOCYTES NFR BLD AUTO: 8.1 %
NEUTROPHILS # BLD AUTO: 2.73 X10*3/UL (ref 1.2–7.7)
NEUTROPHILS NFR BLD AUTO: 45.8 %
NRBC BLD-RTO: 0 /100 WBCS (ref 0–0)
PLATELET # BLD AUTO: 311 X10*3/UL (ref 150–450)
POTASSIUM SERPL-SCNC: 3.8 MMOL/L (ref 3.5–5.3)
PROT SERPL-MCNC: 6.3 G/DL (ref 6.4–8.2)
RBC # BLD AUTO: 4.29 X10*6/UL (ref 4–5.2)
SODIUM SERPL-SCNC: 138 MMOL/L (ref 136–145)
WBC # BLD AUTO: 5.9 X10*3/UL (ref 4.4–11.3)

## 2023-12-18 PROCEDURE — 82607 VITAMIN B-12: CPT

## 2023-12-18 PROCEDURE — 36415 COLL VENOUS BLD VENIPUNCTURE: CPT

## 2023-12-18 PROCEDURE — RXMED WILLOW AMBULATORY MEDICATION CHARGE

## 2023-12-18 RX ORDER — CARVEDILOL 12.5 MG/1
TABLET ORAL
Qty: 180 TABLET | Refills: 0 | Status: SHIPPED | OUTPATIENT
Start: 2023-12-18 | End: 2024-03-11 | Stop reason: SDUPTHER

## 2023-12-19 LAB — VIT B12 SERPL-MCNC: 475 PG/ML (ref 211–911)

## 2024-01-18 ENCOUNTER — CLINICAL SUPPORT (OUTPATIENT)
Dept: PRIMARY CARE | Facility: CLINIC | Age: 54
End: 2024-01-18
Payer: COMMERCIAL

## 2024-01-18 ENCOUNTER — PHARMACY VISIT (OUTPATIENT)
Dept: PHARMACY | Facility: CLINIC | Age: 54
End: 2024-01-18
Payer: COMMERCIAL

## 2024-01-18 DIAGNOSIS — R09.81 NASAL CONGESTION: ICD-10-CM

## 2024-01-18 LAB
POC BINAX EXPIRATION: NORMAL
POC BINAX NOW COVID SERIAL NUMBER: NORMAL
POC RAPID INFLUENZA A: NEGATIVE
POC RAPID INFLUENZA B: NEGATIVE
POC SARS-COV-2 AG BINAX: NORMAL

## 2024-01-18 PROCEDURE — 87804 INFLUENZA ASSAY W/OPTIC: CPT | Performed by: INTERNAL MEDICINE

## 2024-01-18 PROCEDURE — RXMED WILLOW AMBULATORY MEDICATION CHARGE

## 2024-01-18 PROCEDURE — 87811 SARS-COV-2 COVID19 W/OPTIC: CPT | Performed by: INTERNAL MEDICINE

## 2024-01-18 RX ORDER — GUAIFENESIN 600 MG/1
600 TABLET, EXTENDED RELEASE ORAL 2 TIMES DAILY
Qty: 10 TABLET | Refills: 0 | Status: SHIPPED | OUTPATIENT
Start: 2024-01-18 | End: 2024-01-23

## 2024-01-18 RX ORDER — FLUTICASONE PROPIONATE 50 MCG
1 SPRAY, SUSPENSION (ML) NASAL DAILY
Qty: 16 G | Refills: 0 | Status: SHIPPED | OUTPATIENT
Start: 2024-01-18 | End: 2024-02-12 | Stop reason: WASHOUT

## 2024-01-18 RX ORDER — AMOXICILLIN 500 MG/1
500 TABLET, FILM COATED ORAL EVERY 8 HOURS SCHEDULED
Qty: 30 TABLET | Refills: 0 | Status: SHIPPED | OUTPATIENT
Start: 2024-01-18 | End: 2024-01-28

## 2024-01-18 NOTE — PROGRESS NOTES
Patient c/o sinus pressure and congestion under eyes, cheeks, and teeth.   Amoxicillin, mucinex and flonase called in.

## 2024-01-22 ENCOUNTER — PHARMACY VISIT (OUTPATIENT)
Dept: PHARMACY | Facility: CLINIC | Age: 54
End: 2024-01-22
Payer: COMMERCIAL

## 2024-01-22 DIAGNOSIS — F41.1 GAD (GENERALIZED ANXIETY DISORDER): ICD-10-CM

## 2024-01-22 PROCEDURE — RXMED WILLOW AMBULATORY MEDICATION CHARGE

## 2024-01-22 RX ORDER — CITALOPRAM 10 MG/1
10 TABLET ORAL DAILY
Qty: 90 TABLET | Refills: 0 | Status: SHIPPED | OUTPATIENT
Start: 2024-01-22 | End: 2024-04-22 | Stop reason: SDUPTHER

## 2024-02-12 ENCOUNTER — OFFICE VISIT (OUTPATIENT)
Dept: NEUROLOGY | Facility: CLINIC | Age: 54
End: 2024-02-12
Payer: COMMERCIAL

## 2024-02-12 VITALS
HEART RATE: 91 BPM | HEIGHT: 63 IN | SYSTOLIC BLOOD PRESSURE: 140 MMHG | BODY MASS INDEX: 23.04 KG/M2 | DIASTOLIC BLOOD PRESSURE: 80 MMHG | WEIGHT: 130 LBS

## 2024-02-12 DIAGNOSIS — G56.02 CARPAL TUNNEL SYNDROME ON LEFT: ICD-10-CM

## 2024-02-12 DIAGNOSIS — G62.9 AXONAL POLYNEUROPATHY: Primary | ICD-10-CM

## 2024-02-12 PROCEDURE — RXMED WILLOW AMBULATORY MEDICATION CHARGE

## 2024-02-12 PROCEDURE — 1036F TOBACCO NON-USER: CPT | Performed by: PSYCHIATRY & NEUROLOGY

## 2024-02-12 PROCEDURE — 99204 OFFICE O/P NEW MOD 45 MIN: CPT | Performed by: PSYCHIATRY & NEUROLOGY

## 2024-02-12 RX ORDER — GABAPENTIN 300 MG/1
300 CAPSULE ORAL 3 TIMES DAILY
Qty: 30 CAPSULE | Refills: 2 | Status: SHIPPED | OUTPATIENT
Start: 2024-02-12 | End: 2024-03-26 | Stop reason: ALTCHOICE

## 2024-02-12 ASSESSMENT — ENCOUNTER SYMPTOMS
EYES NEGATIVE: 1
WEAKNESS: 0
PSYCHIATRIC NEGATIVE: 1
LIGHT-HEADEDNESS: 1
BACK PAIN: 1
MYALGIAS: 0
RESPIRATORY NEGATIVE: 1
NUMBNESS: 1
GASTROINTESTINAL NEGATIVE: 1
CARDIOVASCULAR NEGATIVE: 1
CONSTITUTIONAL NEGATIVE: 1
JOINT SWELLING: 0
HEMATOLOGIC/LYMPHATIC NEGATIVE: 1

## 2024-02-12 NOTE — PROGRESS NOTES
I saw and evaluated the patient. I personally obtained the key and critical portions of the history and physical exam or was physically present for key and critical portions performed by the resident/fellow. I reviewed the resident/fellow's documentation and discussed the patient with the resident/fellow. I agree with the resident/fellow's medical decision making as documented in the note.    Addendum to Impression:    Examination finding was suggestive of paraesthesia in a  stocking  and glove distribution       Oh Almaraz MD       No. OLAMIDE screening performed.  STOP BANG Legend: 0-2 = LOW Risk; 3-4 = INTERMEDIATE Risk; 5-8 = HIGH Risk

## 2024-02-12 NOTE — PROGRESS NOTES
Bilateral numbness of the arms     History Of Present Illness  Jessica Francois is a 53 y.o. female presenting with PMHx of HTN, GERD, depression/anxiety, hypothyroidism (thyroid cancer s/p thyroidectomy 2019), vitamin d deficiency, presenting today with complaints of bilateral arm numbness and tingling.    Patient stated that the symptom has been on going for about 2 months. Symptoms wake her up at night and are exacerbated by taking a warm shower. There is associated burning at the tips of the finger especially on contact with warm surfaces. She works with the [employer] system and states that her job involves using sitting in front of the computer and typing mostly. She denies hx of back trauma, Denies weakness of muscle groups. Symptoms have been constant and progressive. Patient stated that she is an almost everyday wine drinker. She has never been diagnosed with DM, but does say that she has a significant family hx of DM. She has never been diagnosed with a vitamin deficiency, autoimmune disorder or hx of CVA     Past Medical and Surgical History    Past Medical History:   Diagnosis Date    Allergic contact dermatitis due to plants, except food 08/15/2017    Poison ivy dermatitis    Anxiety disorder, unspecified 2013    Anxiety    Cellulitis, unspecified 2016    Cellulitis    Complete or unspecified spontaneous  without complication         Encounter for screening mammogram for malignant neoplasm of breast 2017    Visit for screening mammogram    Influenza due to other identified influenza virus with other respiratory manifestations 2017    Influenza A    Mild cervical dysplasia     Dysplasia of cervix, low grade (LISA 1)    Other mucopurulent conjunctivitis, unspecified eye 10/08/2014    Pink eye    Other specified postprocedural states     History of loop electrical excision procedure (LEEP)    Papillomavirus as the cause of diseases classified elsewhere     HPV in female     Personal history of other complications of pregnancy, childbirth and the puerperium     History of miscarriage    Personal history of other diseases of the nervous system and sense organs 2014    History of tension headache    Personal history of other endocrine, nutritional and metabolic disease 10/07/2019    History of hyperglycemia    Personal history of other infectious and parasitic diseases     History of HPV infection    Personal history of other malignant neoplasm of skin     History of basal cell carcinoma (BCC)    Personal history of other specified conditions 2014    History of headache    Personal history of other specified conditions 2013    History of fatigue          Past Surgical History:   Procedure Laterality Date    BREAST BIOPSY Left     benign    CERVICAL BIOPSY  W/ LOOP ELECTRODE EXCISION  2017    Cervical Loop Electrosurgical Excision (LEEP)     SECTION, CLASSIC  2017     Section    CHOLECYSTECTOMY  2013    Cholecystectomy Laparoscopic    COLONOSCOPY  2017    Colonoscopy (Fiberoptic)    CT ANGIO NECK  2017    CT NECK ANGIO W AND WO IV CONTRAST 2017 [facility]    CT HEAD ANGIO W AND WO IV CONTRAST  2017    CT HEAD ANGIO W AND WO IV CONTRAST 2017 [facility]    HYSTEROSCOPY  2017    Hysteroscopy With Endometrial Ablation    LAPAROSCOPY DIAGNOSTIC / BIOPSY / ASPIRATION / LYSIS  2017    Exploratory Laparoscopy    OTHER SURGICAL HISTORY  2017    Colposcopy Cervix With Biopsy(S)    TONSILLECTOMY  2017    Tonsillectomy        Social History  Social History     Tobacco Use    Smoking status: Former     Types: Cigarettes    Smokeless tobacco: Never   Substance Use Topics    Alcohol use: Yes     Comment: occasional    Drug use: Never     Allergies  Patient has no known allergies.  (Not in a hospital admission)      Review of Systems   Constitutional: Negative.    HENT: Negative.      Eyes: Negative.    Respiratory: Negative.     Cardiovascular: Negative.    Gastrointestinal: Negative.    Genitourinary: Negative.    Musculoskeletal:  Positive for back pain. Negative for gait problem, joint swelling and myalgias.   Neurological:  Positive for light-headedness and numbness. Negative for weakness.   Hematological: Negative.    Psychiatric/Behavioral: Negative.           Cardiovascular system: S1-S2 intact  Respiratory clear to auscultation bilateral on deep breathing he feels irritability on the left side of the chest.    Neurological Exam  Mental Status  Awake, alert and oriented to person, place and time. Oriented to person, place, time and situation. Recent and remote memory are intact. Speech is normal. Language is fluent with no aphasia. Fund of knowledge is appropriate for level of education.    Cranial Nerves  CN V:  Left: Facial sensation is normal on the left.    Motor  Normal muscle bulk throughout. No fasciculations present.                                               Right                     Left  Neck flexion                           5                          5  Neck extension                      5                          5   Shoulder abduction               5                          5   Shoulder adduction               5                          5   Shoulder internal rotation      5                          5  Shoulder external rotation     5                          5  Elbow flexion                         5                          5  Elbow extension                    5                          5  Wrist flexion                           5                          5  Wrist extension                      5                          5  Supination                             5                          5  Pronation                               5                          5  Finger flexion                         5                          5  Finger extension                    5  "                         5  Finger abduction                    5                          5  Thumb flexion                        5                          5  Thumb extension                   5                          5  Thumb abduction                   5                          5  Hip flexion                              5                          5  Hip extension                         5                          5  Hip abduction                         5                          5  Hip adduction                         5                          5  Knee flexion                           5                          5  Knee extension                      5                          5  Ankle inversion                      5                          5  Ankle eversion                       5                          5  Plantarflexion                         5                          5  Dorsiflexion                            5                          5  Toe flexion                             5                          5  Toe extension                        5                          5    Sensory  Pinprick abnormality: Proprioception is normal in upper and lower extremities. Left hemisensory loss.   Right: Loss of sensation in the sural, lateral cutaneous thigh and tibial nerve distribution.  Left: Loss of sensation in the sural, tibial and lateral cutaneous thigh nerve distribution. No right-sided hemispatial neglect. No left-sided hemispatial neglect.    Last Recorded Vitals  Blood pressure 140/80, pulse 91, height 1.6 m (5' 3\"), weight 59 kg (130 lb).    Relevant Results      Current Outpatient Medications:     carvedilol (Coreg) 12.5 mg tablet, TAKE 1 TABLET (12.5 MG) BY MOUTH 2 TIMES A DAY WITH MEALS., Disp: 180 tablet, Rfl: 0    cholecalciferol (Vitamin D-3) 50 MCG (2000 UT) tablet, Take 2 tablets (4,000 Units) by mouth once daily., Disp: , Rfl:     citalopram (CeleXA) 10 mg tablet, Take 1 tablet (10 mg) " by mouth once daily., Disp: 90 tablet, Rfl: 0    levothyroxine (Synthroid, Levoxyl) 125 mcg tablet, TAKE 1 TABLET BY MOUTH ONCE DAILY, Disp: 90 tablet, Rfl: 3    omeprazole (PriLOSEC) 40 mg DR capsule, TAKE 1 CAPSULE (40 MG) BY MOUTH ONCE DAILY IN THE EVENING. TAKE BEFORE MEALS. 30 MIN BEFORE DINNER, Disp: 90 capsule, Rfl: 0     Continuous medications    PRN medications, reviewed    I have personally reviewed the following imaging for brain (CT/ MR) and results and discussed in detail with the patient/care giver/family     No results found.     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   #Carpal tunnel syndrome  - Patient presented to the clinic with complaints of numbness and tingling bilateral upper extremity  - Examination finding was suggestive of paraesthesia in a cotton and glove distribution   - Patient counselled on the need to decrease and ultimately taper of EtOH use   - We will obtain a Vitamin panel  to include Vitamin E, B1,6,12, folate   - Will obtain an EMG test   - Obtain a complement assay   - Exclude paraproteinemias and Celiac dx   - Obtain CRP, ESR and CK   - Advised to monitor for food triggers to include gluten foods, lactose and also EtOH   - We will trial patient on Gabapentin 300mg tid  - Advised to taper off escitalopram while on gabapentin   - Reevaluate in 6 weeks     Patient/Family Education: Extensive time was spent educating the patient on relevant anatomy, clinical findings and imaging, as well as discussing the potential diagnoses as discussed above.  Pharmacology: as above. Exercise: I discussed the importance of maintaining a daily exercise program, including stretching and strengthening. Preventative strategies were reviewed, specifically avoidance of any exercises that exacerbate pain.Return to online virtual visit/ clinic visit for follow-up with [facility] in 2 weeks or sooner as needed.The patient expressed understanding and agreement with  the assessment and plan.  Patient encouraged to contact us should they have any questions, concerns, or any changes in symptoms. Thank you for allowing me to participate in the care of your patient.    Gisel Lawrence MD

## 2024-02-13 ENCOUNTER — PHARMACY VISIT (OUTPATIENT)
Dept: PHARMACY | Facility: CLINIC | Age: 54
End: 2024-02-13
Payer: COMMERCIAL

## 2024-02-15 PROCEDURE — 86038 ANTINUCLEAR ANTIBODIES: CPT

## 2024-02-15 PROCEDURE — 86147 CARDIOLIPIN ANTIBODY EA IG: CPT

## 2024-02-15 PROCEDURE — 86431 RHEUMATOID FACTOR QUANT: CPT

## 2024-02-15 PROCEDURE — 86160 COMPLEMENT ANTIGEN: CPT

## 2024-02-15 PROCEDURE — 82232 ASSAY OF BETA-2 PROTEIN: CPT

## 2024-02-15 PROCEDURE — 86141 C-REACTIVE PROTEIN HS: CPT

## 2024-02-15 PROCEDURE — 84155 ASSAY OF PROTEIN SERUM: CPT

## 2024-02-15 PROCEDURE — 84165 PROTEIN E-PHORESIS SERUM: CPT | Performed by: PSYCHIATRY & NEUROLOGY

## 2024-02-15 PROCEDURE — 82746 ASSAY OF FOLIC ACID SERUM: CPT

## 2024-02-15 PROCEDURE — 84165 PROTEIN E-PHORESIS SERUM: CPT

## 2024-02-15 PROCEDURE — 86376 MICROSOMAL ANTIBODY EACH: CPT

## 2024-02-15 PROCEDURE — 86235 NUCLEAR ANTIGEN ANTIBODY: CPT

## 2024-02-20 ENCOUNTER — HOSPITAL ENCOUNTER (OUTPATIENT)
Dept: NEUROLOGY | Facility: CLINIC | Age: 54
Discharge: HOME | End: 2024-02-20
Payer: COMMERCIAL

## 2024-02-20 DIAGNOSIS — G62.9 AXONAL POLYNEUROPATHY: ICD-10-CM

## 2024-02-20 PROCEDURE — 95913 NRV CNDJ TEST 13/> STUDIES: CPT | Performed by: PSYCHIATRY & NEUROLOGY

## 2024-02-20 PROCEDURE — 95886 MUSC TEST DONE W/N TEST COMP: CPT | Performed by: PSYCHIATRY & NEUROLOGY

## 2024-02-21 ENCOUNTER — APPOINTMENT (OUTPATIENT)
Dept: NEUROLOGY | Facility: CLINIC | Age: 54
End: 2024-02-21
Payer: COMMERCIAL

## 2024-03-11 ENCOUNTER — PHARMACY VISIT (OUTPATIENT)
Dept: PHARMACY | Facility: CLINIC | Age: 54
End: 2024-03-11
Payer: COMMERCIAL

## 2024-03-11 DIAGNOSIS — I10 HYPERTENSION, UNSPECIFIED TYPE: ICD-10-CM

## 2024-03-11 PROCEDURE — RXMED WILLOW AMBULATORY MEDICATION CHARGE

## 2024-03-11 RX ORDER — CARVEDILOL 12.5 MG/1
TABLET ORAL
Qty: 60 TABLET | Refills: 0 | Status: SHIPPED | OUTPATIENT
Start: 2024-03-11 | End: 2024-04-09 | Stop reason: SDUPTHER

## 2024-03-26 ENCOUNTER — OFFICE VISIT (OUTPATIENT)
Dept: NEUROLOGY | Facility: CLINIC | Age: 54
End: 2024-03-26
Payer: COMMERCIAL

## 2024-03-26 VITALS
WEIGHT: 134 LBS | HEART RATE: 73 BPM | DIASTOLIC BLOOD PRESSURE: 80 MMHG | SYSTOLIC BLOOD PRESSURE: 134 MMHG | HEIGHT: 63 IN | BODY MASS INDEX: 23.74 KG/M2

## 2024-03-26 DIAGNOSIS — G56.02 CARPAL TUNNEL SYNDROME ON LEFT: Primary | ICD-10-CM

## 2024-03-26 DIAGNOSIS — G56.03 CARPAL TUNNEL SYNDROME, BILATERAL: ICD-10-CM

## 2024-03-26 PROCEDURE — 99214 OFFICE O/P EST MOD 30 MIN: CPT

## 2024-03-26 PROCEDURE — 1036F TOBACCO NON-USER: CPT

## 2024-03-26 ASSESSMENT — VISUAL ACUITY: VA_NORMAL: 1

## 2024-03-26 NOTE — ASSESSMENT & PLAN NOTE
-Recommend trying conservative management, placed rx for volar wrist splints. Try ergonomic keyboard,mouse  -Referral for ortho hand which is good for a year if she fails conservative tx to consider injections, surgical release   -Discussed relationship between b/l CTS and amyloidosis. Pt has no hx of amyloidosis in her family known to her, no cardiac hx other than mom with a fib and self with HTN. Can consider echo in the future and if CTS surgery is done would recommend biopsy of carpal ligaments for amyloid deposits.

## 2024-03-26 NOTE — PATIENT INSTRUCTIONS
"Your EMG is consistent with carpal tunnel in both wrists, moderate. You can try ergonomic keyboard at work.  Try volar \"cock up\" wrist splints.   If you fail this conservative treatment, we can refer you to ortho hand for injections or surgery. Referral placed.   "

## 2024-03-26 NOTE — PROGRESS NOTES
Subjective     Jessica Francois is a  53 y.o. female with a past medical history of  HTN, GERD, depression/anxiety, hypothyroidism (thyroid cancer s/p thyroidectomy 2019), vitamin d deficiency who presents with   Chief Complaint   Patient presents with    Polyneuropathy     Review EMG results       Visit type: follow up visit    HPI   Last seen by Dr Almaraz 24 for bilateral arm numbness and tingling. At that time, symptoms had been going on for about 2 months, waking her up at night and worse when taking a warm shower. Dr. Almaraz ordered EMG and labs.     Pertinent results:  - EMG demonstrated moderate CTS bilaterally   - SPEP, uric acid, ferritin, vitamin E, B6, B1, MMA, folate, C4, CRP, CK, RF, ESR, TPO, C3, beta 2 microglobulin, anti SM, anticardiolipin, ACE, YOVANI/JODI, aldolase all WNL     3/26/24  - Sx are about the same   - Still waking her at night, bothers her when she drives, blow dries her hair, working at her computer. 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 Almaraz  - Mom, dad, brother all had carpal tunnel. Mom had trigger finger. Arthritis runs in the family.       Review of Systems  10 point review of systems performed and is negative except as noted in the HPI.     All other systems have been reviewed and are negative for complaint.    Past Medical History:   Diagnosis Date    Allergic contact dermatitis due to plants, except food 08/15/2017    Poison ivy dermatitis    Anxiety disorder, unspecified 2013    Anxiety    Cellulitis, unspecified 2016    Cellulitis    Complete or unspecified spontaneous  without complication         Encounter for screening mammogram for malignant neoplasm of breast 2017    Visit for screening mammogram    Influenza due to other  identified influenza virus with other respiratory manifestations 2017    Influenza A    Mild cervical dysplasia     Dysplasia of cervix, low grade (LISA 1)    Other mucopurulent conjunctivitis, unspecified eye 10/08/2014    Pink eye    Other specified postprocedural states     History of loop electrical excision procedure (LEEP)    Papillomavirus as the cause of diseases classified elsewhere     HPV in female    Personal history of other complications of pregnancy, childbirth and the puerperium     History of miscarriage    Personal history of other diseases of the nervous system and sense organs 2014    History of tension headache    Personal history of other endocrine, nutritional and metabolic disease 10/07/2019    History of hyperglycemia    Personal history of other infectious and parasitic diseases     History of HPV infection    Personal history of other malignant neoplasm of skin     History of basal cell carcinoma (BCC)    Personal history of other specified conditions 2014    History of headache    Personal history of other specified conditions 2013    History of fatigue       No relevant family history has been documented for this patient.    Past Surgical History:   Procedure Laterality Date    BREAST BIOPSY Left     benign    CERVICAL BIOPSY  W/ LOOP ELECTRODE EXCISION  2017    Cervical Loop Electrosurgical Excision (LEEP)     SECTION, CLASSIC  2017     Section    CHOLECYSTECTOMY  2013    Cholecystectomy Laparoscopic    COLONOSCOPY  2017    Colonoscopy (Fiberoptic)    CT ANGIO NECK  2017    CT NECK ANGIO W AND WO IV CONTRAST [facility]    CT HEAD ANGIO W AND WO IV CONTRAST  2017    CT HEAD ANGIO W AND WO IV CONTRAST 2017 [facility]    HYSTEROSCOPY  2017    Hysteroscopy With Endometrial Ablation    LAPAROSCOPY DIAGNOSTIC / BIOPSY / ASPIRATION / LYSIS  2017    Exploratory Laparoscopy     OTHER SURGICAL HISTORY  01/24/2017    Colposcopy Cervix With Biopsy(S)    TONSILLECTOMY  01/24/2017    Tonsillectomy       Social History     Substance and Sexual Activity   Drug Use Never     Tobacco Use: Medium Risk (3/26/2024)    Patient History     Smoking Tobacco Use: Former     Smokeless Tobacco Use: Never     Passive Exposure: Not on file     Alcohol Use: Not on file           Objective     Neurological Exam  Mental Status  Awake, alert and oriented to person, place and time. Oriented to person, place, time and situation. Recent and remote memory are intact. Speech is normal. Language is fluent with no aphasia. Attention and concentration are normal. Fund of knowledge is appropriate for level of education.    Cranial Nerves  CN II: Visual acuity is normal. Visual fields full to confrontation.  CN III, IV, VI: Extraocular movements intact bilaterally. Normal lids and orbits bilaterally. Pupils equal round and reactive to light bilaterally.  CN V: Facial sensation is normal.  CN VII: Full and symmetric facial movement.  CN VIII: Hearing is normal.  CN IX, X: Palate elevates symmetrically. Normal gag reflex.  CN XI: Shoulder shrug strength is normal.  CN XII: Tongue midline without atrophy or fasciculations.    Motor   Strength is 5/5 throughout all four extremities.    Sensory  Light touch is normal in upper and lower extremities. Pinprick abnormality: Diminished sensation of pinprick over the bilateral hands, worse in the left. Worst over the bilateral thumbs, index, and middle fingers. . Temperature is normal in upper and lower extremities. Vibration is normal in upper and lower extremities. Proprioception is normal in upper and lower extremities.     Reflexes                                            Right                      Left  Brachioradialis                    2+                         2+  Biceps                                 2+                         2+  Patellar                                " 2+                         2+  Achilles                                2+                         2+    Gait  Normal casual, toe, heel and tandem gait.           Results for orders placed or performed in visit on 12/21/17   XR LUMBAR SPINE 2-3 VIEWS    Narrative    MRN: [id number]  Patient Name: RASHEEDA RODRIGUEZ     STUDY:  SPINE, LUMBOSACRAL; 2 OR 3 VIEWS; 12/21/2017 3:05 pm     INDICATION:  Signs/Symptoms: back pain.     COMPARISON:  None.     ACCESSION NUMBER(S):  [id number]     ORDERING CLINICIAN:  JOSE R PURI     FINDINGS:  Three views of the lumbar spine demonstrate normal alignment without  evidence of fracture or subluxation. The disc spaces are well  preserved. There are no compression fractures. The sacroiliac joints,  as visualized, appear unremarkable.     IMPRESSION:  Normal three-view lumbar spine series.      Results for orders placed or performed in visit on 08/18/17   CT ANGIO NECK W AND WO IV CONTRAST    Narrative    Addendum Begins  MRN: [id number]  Patient Name: RASHEEDA RODRIGUEZ     ADDENDUM:  There is an M*Modal voice recognition error in the dictation. The 3rd  sentence in the technique portion of the report should read as  follows:     \"The patient received 80 mL of Optiray 350 contrast intravenously.\"    Addendum Ends  MRN: [id number]  Patient Name: RASHEEDA RODRIGUEZ     STUDY:  CT ANGIO NECK; NR CT ANGIO HEAD W/O-W INCLUDE POST PROC; 8/18/2017  1:40 pm     INDICATION:  Signs/Symptoms: sudden onset of dizziness/lightheadedness,  w/sensation of occipital pressure, diaphoretic.     COMPARISON:  CT of the head dated 08/18/2017     ACCESSION NUMBER(S):  [id number]; [id number]     ORDERING CLINICIAN:  VINEET MCKEON     TECHNIQUE:  Thin cut axial CT images were generated over the upper thorax, neck,  and head during the arterial passage of a full contrast bolus.  The  bolus was generated with a power injector and followed with immediate  saline flush. The patient received 580 mL of Optiray 350 " contrast  intravenously. These image data were subtracted and then used for 3-D  reconstructions.     Maximum intensity projections and shaded surface displays were  generated in multiple planes. In addition images were transferred  into a 3-D processing program and additional projections and displays  were reviewed by the interpreting physician.     FINDINGS:  The CT angiogram through the upper thorax does not include the origin  of the right brachiocephalic artery. There is no stenosis along the  origins of the left common carotid artery or left subclavian artery.  No significant stenosis is noted along the origins of the vertebral  arteries.     The CT angiogram through the neck demonstrates no significant  stenosis along the common carotid arteries, carotid bifurcations, or  cervical segments of the internal carotid arteries. There is no  significant stenosis along the cervical segments of the vertebral  arteries. There is a right dominant vertebral artery.     The CT angiogram of the head demonstrates no significant intracranial  stenosis or intracranial aneurysm. There is a small focus of likely  incidental venous vascular ectasia along the anteromedial left middle  cranial fossa as seen on axial slice 451 of 577.     IMPRESSION:  The CT angiogram through the upper thorax does not include the origin  of the right brachiocephalic artery. There is no stenosis along the  origins of the left common carotid artery or left subclavian artery.  No significant stenosis is noted along the origins of the vertebral  arteries.     The CT angiogram through the neck demonstrates no significant  stenosis along the common carotid arteries, carotid bifurcations, or  cervical segments of the internal carotid arteries. There is no  significant stenosis along the cervical segments of the vertebral  arteries. There is a right dominant vertebral artery.     The CT angiogram of the head demonstrates no significant  "intracranial  stenosis or intracranial aneurysm.     The study was interpreted at [facility].   CT HEAD WO IV CONTRAST    Narrative    MRN: [id number]  Patient Name: RASHEEDA RODRIGUEZ     STUDY:  CT HEAD WO CONTRAST; 8/18/2017 12:45 pm     INDICATION:  Signs/Symptoms: dizziness.     COMPARISON:  None     ACCESSION NUMBER(S):  [id number]     ORDERING CLINICIAN:  VINEET MCKEON     TECHNIQUE:  Noncontrast axial CT scan of head was performed.  The images were  reviewed in bone, brain, blood and soft tissue windows.     FINDINGS:  CSF Spaces: The ventricles, sulci and basal cisterns are within  normal limits.     Parenchyma:  The grey-white differentiation is intact. There is no  mass effect or midline shift. There is no extraaxial fluid  collection. There is no intracranial hemorrhage.     Calvarium: The calvarium is unremarkable.     Paranasal sinuses and mastoids: Visualized paranasal sinuses and  mastoids are clear.     IMPRESSION:  Negative unenhanced CT scan of the brain.   CT ANGIO HEAD W AND WO IV CONTRAST    Narrative    Addendum Begins  MRN: [id number]  Patient Name: RASHEEDA RODRIGUEZ     ADDENDUM:  There is an M*Modal voice recognition error in the dictation. The 3rd  sentence in the technique portion of the report should read as  follows:     \"The patient received 80 mL of Optiray 350 contrast intravenously.\"    Addendum Ends  MRN: [id number]  Patient Name: RASHEEDA RODRIGUEZ     STUDY:  CT ANGIO NECK; NR CT ANGIO HEAD W/O-W INCLUDE POST PROC; 8/18/2017  1:40 pm     INDICATION:  Signs/Symptoms: sudden onset of dizziness/lightheadedness,  w/sensation of occipital pressure, diaphoretic.     COMPARISON:  CT of the head dated 08/18/2017     ACCESSION NUMBER(S):  [id number]; [id number]     ORDERING CLINICIAN:  VINEET MCKEON     TECHNIQUE:  Thin cut axial CT images were generated over the upper thorax, neck,  and head during the arterial passage of a full contrast bolus.  The  bolus was generated with a " power injector and followed with immediate  saline flush. The patient received 580 mL of Optiray 350 contrast  intravenously. These image data were subtracted and then used for 3-D  reconstructions.     Maximum intensity projections and shaded surface displays were  generated in multiple planes. In addition images were transferred  into a 3-D processing program and additional projections and displays  were reviewed by the interpreting physician.     FINDINGS:  The CT angiogram through the upper thorax does not include the origin  of the right brachiocephalic artery. There is no stenosis along the  origins of the left common carotid artery or left subclavian artery.  No significant stenosis is noted along the origins of the vertebral  arteries.     The CT angiogram through the neck demonstrates no significant  stenosis along the common carotid arteries, carotid bifurcations, or  cervical segments of the internal carotid arteries. There is no  significant stenosis along the cervical segments of the vertebral  arteries. There is a right dominant vertebral artery.     The CT angiogram of the head demonstrates no significant intracranial  stenosis or intracranial aneurysm. There is a small focus of likely  incidental venous vascular ectasia along the anteromedial left middle  cranial fossa as seen on axial slice 451 of 577.     IMPRESSION:  The CT angiogram through the upper thorax does not include the origin  of the right brachiocephalic artery. There is no stenosis along the  origins of the left common carotid artery or left subclavian artery.  No significant stenosis is noted along the origins of the vertebral  arteries.     The CT angiogram through the neck demonstrates no significant  stenosis along the common carotid arteries, carotid bifurcations, or  cervical segments of the internal carotid arteries. There is no  significant stenosis along the cervical segments of the vertebral  arteries. There is a right  dominant vertebral artery.     The CT angiogram of the head demonstrates no significant intracranial  stenosis or intracranial aneurysm.     The study was interpreted at [facility].   Results for orders placed or performed in visit on 08/21/14   MR BRAIN WO IV CONTRAST    Narrative    MRI scan of the brain without gadolinium 8/21/2014     History: Severe headache     Technique: Multiplanar multisequence MRI scan of the brain was   obtained including diffusion weighted imaging without gadolinium     Findings: The signal intensity within the brain is normal. There is   no diffusion evidence of acute or subacute ischemic change. There is   no evidence of focal mass effect or edema. The level of the   cerebellar tonsils is normal. The ventricular system is normal.     Impression:  1. Unremarkable examination              Assessment/Plan   Problem List Items Addressed This Visit       Carpal tunnel syndrome, bilateral    Overview     Complaints of numbness in the bilateral hands which wakes her at night, worsens with driving, doing her hair since the end of 2023.   - EMG w moderate carpal tunnel syndrome bilaterally, no cervical radic or large fiber neuropathy          Current Assessment & Plan     -Recommend trying conservative management, placed rx for volar wrist splints. Try ergonomic keyboard,mouse  -Referral for ortho hand which is good for a year if she fails conservative tx to consider injections, surgical release   -Discussed relationship between b/l CTS and amyloidosis. Pt has no hx of amyloidosis in her family known to her, no cardiac hx other than mom with a fib and self with HTN. Can consider echo in the future and if CTS surgery is done would recommend biopsy of carpal ligaments for amyloid deposits.          Relevant Orders    Referral to Orthopaedic Surgery    Wrist brace cock up volar     Other Visit Diagnoses       Carpal tunnel syndrome on left    -  Primary

## 2024-04-09 DIAGNOSIS — I10 HYPERTENSION, UNSPECIFIED TYPE: ICD-10-CM

## 2024-04-09 RX ORDER — CARVEDILOL 12.5 MG/1
TABLET ORAL
Qty: 180 TABLET | Refills: 0 | Status: SHIPPED | OUTPATIENT
Start: 2024-04-09 | End: 2025-04-09

## 2024-04-10 DIAGNOSIS — K21.9 GERD WITHOUT ESOPHAGITIS: ICD-10-CM

## 2024-04-10 PROCEDURE — RXMED WILLOW AMBULATORY MEDICATION CHARGE

## 2024-04-10 RX ORDER — OMEPRAZOLE 40 MG/1
40 CAPSULE, DELAYED RELEASE ORAL
COMMUNITY
End: 2024-04-10 | Stop reason: SDUPTHER

## 2024-04-10 RX ORDER — OMEPRAZOLE 40 MG/1
40 CAPSULE, DELAYED RELEASE ORAL
Qty: 90 CAPSULE | Refills: 0 | Status: SHIPPED | OUTPATIENT
Start: 2024-04-10

## 2024-04-12 ENCOUNTER — PHARMACY VISIT (OUTPATIENT)
Dept: PHARMACY | Facility: CLINIC | Age: 54
End: 2024-04-12
Payer: COMMERCIAL

## 2024-04-22 DIAGNOSIS — F41.1 GAD (GENERALIZED ANXIETY DISORDER): ICD-10-CM

## 2024-04-22 RX ORDER — CITALOPRAM 10 MG/1
10 TABLET ORAL DAILY
Qty: 90 TABLET | Refills: 0 | Status: SHIPPED | OUTPATIENT
Start: 2024-04-22

## 2024-06-05 ENCOUNTER — LAB (OUTPATIENT)
Dept: LAB | Facility: LAB | Age: 54
End: 2024-06-05
Payer: COMMERCIAL

## 2024-06-05 DIAGNOSIS — C73 MALIGNANT NEOPLASM OF THYROID GLAND (MULTI): ICD-10-CM

## 2024-06-05 DIAGNOSIS — E55.9 VITAMIN D DEFICIENCY: ICD-10-CM

## 2024-06-05 LAB — TSH SERPL-ACNC: 1.39 MIU/L (ref 0.44–3.98)

## 2024-06-05 PROCEDURE — 86800 THYROGLOBULIN ANTIBODY: CPT

## 2024-06-05 PROCEDURE — 82306 VITAMIN D 25 HYDROXY: CPT

## 2024-06-05 PROCEDURE — 36415 COLL VENOUS BLD VENIPUNCTURE: CPT

## 2024-06-05 PROCEDURE — 84432 ASSAY OF THYROGLOBULIN: CPT

## 2024-06-06 LAB — 25(OH)D3 SERPL-MCNC: 32 NG/ML (ref 30–100)

## 2024-06-07 LAB
BILL ONLY-THYROGLOBULIN: NORMAL
THYROGLOB AB SERPL-ACNC: <0.9 IU/ML (ref 0–4)
THYROGLOB SERPL-MCNC: 0.1 NG/ML (ref 1.3–31.8)
THYROGLOB SERPL-MCNC: ABNORMAL NG/ML (ref 1.3–31.8)

## 2024-06-10 ENCOUNTER — OFFICE VISIT (OUTPATIENT)
Dept: ENDOCRINOLOGY | Facility: CLINIC | Age: 54
End: 2024-06-10
Payer: COMMERCIAL

## 2024-06-10 VITALS
SYSTOLIC BLOOD PRESSURE: 143 MMHG | BODY MASS INDEX: 23.74 KG/M2 | HEART RATE: 63 BPM | DIASTOLIC BLOOD PRESSURE: 92 MMHG | WEIGHT: 134 LBS

## 2024-06-10 DIAGNOSIS — E55.9 VITAMIN D DEFICIENCY: ICD-10-CM

## 2024-06-10 DIAGNOSIS — C73 MALIGNANT NEOPLASM OF THYROID GLAND (MULTI): Primary | ICD-10-CM

## 2024-06-10 PROCEDURE — 99213 OFFICE O/P EST LOW 20 MIN: CPT | Performed by: INTERNAL MEDICINE

## 2024-06-10 RX ORDER — VIT C/E/ZN/COPPR/LUTEIN/ZEAXAN 250MG-90MG
100 CAPSULE ORAL DAILY
COMMUNITY

## 2024-06-10 NOTE — PATIENT INSTRUCTIONS
RECOMMENDATIONS  Levothyroxine 125 mcg/day  Take levothyroxine on an empty stomach with water alone, 1 hour before eating or taking other medications, 4 hours before any calcium or iron supplement.    Vitamin D 4000 units per day    Follow up 1 year  Repeat labs 1 week before next appointment  Ultrasound neck at next appointment

## 2024-06-10 NOTE — PROGRESS NOTES
History Of Present Illness  Jessica Francois is a 53 y.o. female with a history of thyroid cancer.     Levothyroxine 125 mcg/day  Patient is taking levothyroxine on an empty stomach with water alone.     Thyroid cancer diagnosis date: 19   Type: Papillary  Size of primary tumor: 0.3 cm, isthmus  Extrathyroidal extension (ETE): Negative  Lymph Nodes: 0/1   Distant Metastases: None known   Pathologic Staging: pT1a N0 Mx.      Surgery: Thyroidectomy (19)          Past Medical History  She has a past medical history of Allergic contact dermatitis due to plants, except food (08/15/2017), Anxiety disorder, unspecified (2013), Cellulitis, unspecified (2016), Complete or unspecified spontaneous  without complication ([facility]), Encounter for screening mammogram for malignant neoplasm of breast (2017), Influenza due to other identified influenza virus with other respiratory manifestations (2017), Mild cervical dysplasia, Other mucopurulent conjunctivitis, unspecified eye (10/08/2014), Other specified postprocedural states, Papillomavirus as the cause of diseases classified elsewhere, Personal history of other complications of pregnancy, childbirth and the puerperium, Personal history of other diseases of the nervous system and sense organs (2014), Personal history of other endocrine, nutritional and metabolic disease (10/07/2019), Personal history of other infectious and parasitic diseases, Personal history of other malignant neoplasm of skin, Personal history of other specified conditions (2014), and Personal history of other specified conditions (2013).    Surgical History  She has a past surgical history that includes Cholecystectomy (2013); Tonsillectomy (2017); Hysteroscopy (2017); Cervical biopsy w/ loop electrode excision (2017); Laparoscopy diagnostic / biopsy / aspiration / lysis (2017); Colonoscopy (2017); Other surgical  history (2017);  section, classic (2017); CT angio neck (2017); CT angio head w and wo IV contrast (2017); and Breast biopsy (Left).     Social History  She reports that she has quit smoking. Her smoking use included cigarettes. She has never used smokeless tobacco. She reports current alcohol use. She reports that she does not use drugs.    Family History  Family History   Problem Relation Name Age of Onset    Diabetes Mother      Thyroid disease Mother      Heart disease Father      Diabetes Father      Bone cancer Father      Hyperthyroidism Brother      Thyroid cancer Cousin         Medications  Current Outpatient Medications   Medication Instructions    carvedilol (Coreg) 12.5 mg tablet TAKE 1 TABLET (12.5 MG) BY MOUTH 2 TIMES A DAY WITH MEALS.    citalopram (CELEXA) 10 mg, oral, Daily    levothyroxine (Synthroid, Levoxyl) 125 mcg tablet TAKE 1 TABLET BY MOUTH ONCE DAILY    omeprazole (PRILOSEC) 40 mg, oral, Daily before breakfast, Do not crush or chew.       Allergies  Patient has no known allergies.    Review of Systems   Constitutional:  Negative for fatigue, fever and unexpected weight change.   HENT:  Negative for trouble swallowing.    Eyes:  Negative for visual disturbance.   Respiratory:  Negative for shortness of breath.    Cardiovascular:  Negative for chest pain and palpitations.   Gastrointestinal:  Negative for abdominal pain, constipation, diarrhea, nausea and vomiting.   Endocrine: Negative for cold intolerance and heat intolerance.   Musculoskeletal:  Negative for neck pain.   Skin:  Negative for rash.   Neurological:  Positive for numbness. Negative for tremors, weakness and headaches.   Psychiatric/Behavioral:  The patient is not nervous/anxious.          Last Recorded Vitals  Blood pressure (!) 143/92, pulse 63, weight 60.8 kg (134 lb).    Physical Exam  Constitutional:       General: She is not in acute distress.  Neurological:      Mental Status: She is  alert.          Relevant Results  Lab Results   Component Value Date    TSH 1.39 06/05/2024    FREET4 0.75 (L) 05/21/2018    THYROGLOBU 0.1 (L) 06/05/2024    THYROGLCMSMS Not Applicable 06/05/2024    THYROGLOBULI <0.9 06/05/2024      Latest Reference Range & Units 06/05/24 15:50   Vitamin D, 25-Hydroxy, Total 30 - 100 ng/mL 32           IMPRESSION  PAPILLARY THYROID CARCINOMA     VITAMIN D DEFICIENCY       RECOMMENDATIONS  Levothyroxine 125 mcg/day  Take levothyroxine on an empty stomach with water alone, 1 hour before eating or taking other medications, 4 hours before any calcium or iron supplement.    Vitamin D 4000 units per day    Follow up 1 year  Repeat labs 1 week before next appointment  Ultrasound neck at next appointment     nontender/soft/bowel sounds normal

## 2024-06-12 RX ORDER — LEVOTHYROXINE SODIUM 125 UG/1
125 TABLET ORAL DAILY
Qty: 90 TABLET | Refills: 3 | Status: SHIPPED | OUTPATIENT
Start: 2024-06-12 | End: 2025-06-12

## 2024-06-14 ENCOUNTER — PHARMACY VISIT (OUTPATIENT)
Dept: PHARMACY | Facility: CLINIC | Age: 54
End: 2024-06-14
Payer: COMMERCIAL

## 2024-06-14 PROCEDURE — RXMED WILLOW AMBULATORY MEDICATION CHARGE

## 2024-06-24 ENCOUNTER — APPOINTMENT (OUTPATIENT)
Dept: ORTHOPEDIC SURGERY | Facility: CLINIC | Age: 54
End: 2024-06-24
Payer: COMMERCIAL

## 2024-06-24 DIAGNOSIS — G56.03 CARPAL TUNNEL SYNDROME, BILATERAL: Primary | ICD-10-CM

## 2024-06-24 PROCEDURE — 1036F TOBACCO NON-USER: CPT | Performed by: ORTHOPAEDIC SURGERY

## 2024-06-24 PROCEDURE — 99203 OFFICE O/P NEW LOW 30 MIN: CPT | Performed by: ORTHOPAEDIC SURGERY

## 2024-06-24 RX ORDER — SODIUM CHLORIDE, SODIUM LACTATE, POTASSIUM CHLORIDE, CALCIUM CHLORIDE 600; 310; 30; 20 MG/100ML; MG/100ML; MG/100ML; MG/100ML
100 INJECTION, SOLUTION INTRAVENOUS CONTINUOUS
OUTPATIENT
Start: 2024-06-24

## 2024-06-24 RX ORDER — CEFAZOLIN SODIUM 2 G/100ML
2 INJECTION, SOLUTION INTRAVENOUS ONCE
OUTPATIENT
Start: 2024-06-24 | End: 2024-06-24

## 2024-06-24 ASSESSMENT — PAIN SCALES - GENERAL: PAINLEVEL_OUTOF10: 7

## 2024-06-24 ASSESSMENT — PAIN - FUNCTIONAL ASSESSMENT: PAIN_FUNCTIONAL_ASSESSMENT: 0-10

## 2024-06-25 ASSESSMENT — ENCOUNTER SYMPTOMS
FEVER: 0
ARTHRALGIAS: 1
TROUBLE SWALLOWING: 0
SINUS PRESSURE: 0
SHORTNESS OF BREATH: 0
CHILLS: 0
EYE DISCHARGE: 0
COLOR CHANGE: 0
JOINT SWELLING: 0
WHEEZING: 0
FACIAL SWELLING: 0

## 2024-06-25 NOTE — PROGRESS NOTES
Reason for Appointment  Chief Complaint   Patient presents with    Left Wrist - Carpal Tunnel    Right Wrist - Carpal Tunnel     History of Present Illness  New patient is a 54 y.o. female here today for evaluation of bilateral hand numbness left greater than right.  She has had symptoms that have been slowly increasing for the last 6 months and symptoms are worse at night.  Recent EMG is reviewed with the report and shows moderate bilateral carpal tunnel syndrome.  He has been wearing wrist braces at night that have not helped her.  She would like to discuss surgical intervention today.    Past Medical History:   Diagnosis Date    Allergic contact dermatitis due to plants, except food 08/15/2017    Poison ivy dermatitis    Anxiety disorder, unspecified 2013    Anxiety    Cellulitis, unspecified 2016    Cellulitis    Complete or unspecified spontaneous  without complication ([facility])         Encounter for screening mammogram for malignant neoplasm of breast 2017    Visit for screening mammogram    Influenza due to other identified influenza virus with other respiratory manifestations 2017    Influenza A    Mild cervical dysplasia     Dysplasia of cervix, low grade (LISA 1)    Other mucopurulent conjunctivitis, unspecified eye 10/08/2014    Pink eye    Other specified postprocedural states     History of loop electrical excision procedure (LEEP)    Papillomavirus as the cause of diseases classified elsewhere     HPV in female    Personal history of other complications of pregnancy, childbirth and the puerperium     History of miscarriage    Personal history of other diseases of the nervous system and sense organs 2014    History of tension headache    Personal history of other endocrine, nutritional and metabolic disease 10/07/2019    History of hyperglycemia    Personal history of other infectious and parasitic diseases     History of HPV infection    Personal history of  other malignant neoplasm of skin     History of basal cell carcinoma (BCC)    Personal history of other specified conditions 2014    History of headache    Personal history of other specified conditions 2013    History of fatigue       Past Surgical History:   Procedure Laterality Date    BREAST BIOPSY Left     benign    CERVICAL BIOPSY  W/ LOOP ELECTRODE EXCISION  2017    Cervical Loop Electrosurgical Excision (LEEP)     SECTION, CLASSIC  2017     Section    CHOLECYSTECTOMY  2013    Cholecystectomy Laparoscopic    COLONOSCOPY  2017    Colonoscopy (Fiberoptic)    CT ANGIO NECK  2017    CT NECK ANGIO W AND WO IV CONTRAST 2017 [facility]    CT HEAD ANGIO W AND WO IV CONTRAST  2017    CT HEAD ANGIO W AND WO IV CONTRAST 2017 [facility]    HYSTEROSCOPY  2017    Hysteroscopy With Endometrial Ablation    LAPAROSCOPY DIAGNOSTIC / BIOPSY / ASPIRATION / LYSIS  2017    Exploratory Laparoscopy    OTHER SURGICAL HISTORY  2017    Colposcopy Cervix With Biopsy(S)    TONSILLECTOMY  2017    Tonsillectomy       Medication Documentation Review Audit       Reviewed by Genie Martines PA-C (Physician Assistant) on 24 at 0928      Medication Order Taking? Sig Documenting Provider Last Dose Status   carvedilol (Coreg) 12.5 mg tablet [id number] Yes TAKE 1 TABLET (12.5 MG) BY MOUTH 2 TIMES A DAY WITH MEALS. Bessie Pritchard MD Taking Active   cholecalciferol (Vitamin D3) 25 MCG (1000 UT) capsule [id number] Yes Take 4 capsules (100 mcg) by mouth once daily. Historical Provider, MD Taking Active   citalopram (CeleXA) 10 mg tablet [id number] Yes Take 1 tablet (10 mg) by mouth once daily. Bessie Pritchard MD Taking Active   levothyroxine (Synthroid, Levoxyl) 125 mcg tablet [id number] Yes TAKE 1 TABLET BY MOUTH ONCE DAILY Kenyon Cadet MD Taking Active   omeprazole (PriLOSEC) 40 mg DR capsule [id number] Yes Take 1 capsule  (40 mg) by mouth once daily in the morning. Take before meals. Do not crush or chew. Bessie Pritchard MD Taking Active                    No Known Allergies    Review of Systems   Constitutional:  Negative for chills and fever.   HENT:  Negative for facial swelling, sinus pressure and trouble swallowing.    Eyes:  Negative for discharge.   Respiratory:  Negative for shortness of breath and wheezing.    Cardiovascular:  Negative for chest pain.   Musculoskeletal:  Positive for arthralgias. Negative for joint swelling.   Skin:  Negative for color change and pallor.   All other systems reviewed and are negative.    Exam   On exam patient is alert, awake, no acute distress.  Head is normocephalic, no JVD, no auditory wheezes.  She has good shoulder, elbow, and wrist motion.  Mild DJD in the hands but no significant atrophy.  She has positive Tinel's over bilateral median nerves left greater than right and positive median nerve compression test left greater than right.  Good digital motion with no triggering.  Good pulses and sensation in the upper extremities.  Skin is warm and dry without ulcerations, no other swelling lymphadenopathy.    Assessment   Encounter Diagnosis   Name Primary?    Carpal tunnel syndrome, bilateral Yes       Plan   She is having classic carpal tunnel symptoms and with significant findings on EMG, we discussed operative treatment today.  Symptoms are worse in the left hand and she would like to start with this hand.  She understands the risk of surge including nerve, artery, tendon damage, infection, continued pain, need for future surgery.  She will call and schedule left carpal tunnel release    Written by Genie Xiong saw, evaluated, and treated the patient with the PA

## 2024-06-26 ENCOUNTER — PREP FOR PROCEDURE (OUTPATIENT)
Dept: ORTHOPEDIC SURGERY | Facility: HOSPITAL | Age: 54
End: 2024-06-26
Payer: COMMERCIAL

## 2024-06-26 DIAGNOSIS — G56.03 CARPAL TUNNEL SYNDROME, BILATERAL: Primary | ICD-10-CM

## 2024-07-04 DIAGNOSIS — K21.9 GERD WITHOUT ESOPHAGITIS: ICD-10-CM

## 2024-07-05 RX ORDER — OMEPRAZOLE 40 MG/1
40 CAPSULE, DELAYED RELEASE ORAL
Qty: 90 CAPSULE | Refills: 0 | Status: SHIPPED | OUTPATIENT
Start: 2024-07-05

## 2024-07-23 ENCOUNTER — PHARMACY VISIT (OUTPATIENT)
Dept: PHARMACY | Facility: CLINIC | Age: 54
End: 2024-07-23
Payer: COMMERCIAL

## 2024-07-23 PROCEDURE — RXMED WILLOW AMBULATORY MEDICATION CHARGE

## 2024-07-26 ENCOUNTER — PRE-ADMISSION TESTING (OUTPATIENT)
Dept: PREADMISSION TESTING | Facility: HOSPITAL | Age: 54
End: 2024-07-26
Payer: COMMERCIAL

## 2024-07-26 ENCOUNTER — APPOINTMENT (OUTPATIENT)
Dept: PREADMISSION TESTING | Facility: HOSPITAL | Age: 54
End: 2024-07-26
Payer: COMMERCIAL

## 2024-07-26 VITALS
HEART RATE: 65 BPM | RESPIRATION RATE: 16 BRPM | WEIGHT: 138 LBS | HEIGHT: 63 IN | OXYGEN SATURATION: 98 % | BODY MASS INDEX: 24.45 KG/M2 | DIASTOLIC BLOOD PRESSURE: 87 MMHG | SYSTOLIC BLOOD PRESSURE: 143 MMHG | TEMPERATURE: 97.9 F

## 2024-07-26 DIAGNOSIS — G56.03 CARPAL TUNNEL SYNDROME, BILATERAL: ICD-10-CM

## 2024-07-26 PROCEDURE — 99203 OFFICE O/P NEW LOW 30 MIN: CPT

## 2024-07-26 ASSESSMENT — ENCOUNTER SYMPTOMS
CONSTITUTIONAL NEGATIVE: 1
EYES NEGATIVE: 1
HEMATOLOGIC/LYMPHATIC NEGATIVE: 1
CARDIOVASCULAR NEGATIVE: 1
MUSCULOSKELETAL NEGATIVE: 1
PSYCHIATRIC NEGATIVE: 1
NUMBNESS: 1
ALLERGIC/IMMUNOLOGIC NEGATIVE: 1
GASTROINTESTINAL NEGATIVE: 1
ENDOCRINE NEGATIVE: 1
RESPIRATORY NEGATIVE: 1

## 2024-07-26 NOTE — CPM/PAT H&P
CPM/PAT Evaluation       Name: Jessica Francois (Jessica Francois)  /Age: 1970/54 y.o.     In-Person       Chief Complaint: Carpel tunnel    HPI Jessica Francois is a 54 year old female in with complaints of bilateral hand numbness since the end of last year. She states the left hand is worse than the right. She stated the numbness and tingling is worse at night time. She admits to having occasional sharp pain in the wrist area. 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 She has tried using wrist braces with no relief. She denies fever, chills, nausea, vomiting, chest pain, sob, and palpitations. She is scheduled for a decompression median nerve carpel tunnel release of the left hand    Past Medical History:   Diagnosis Date    Allergic contact dermatitis due to plants, except food 08/15/2017    Poison ivy dermatitis    Anxiety disorder, unspecified 2013    Anxiety    Cellulitis, unspecified 2016    Cellulitis    Complete or unspecified spontaneous  without complication ([facility])         Encounter for screening mammogram for malignant neoplasm of breast 2017    Visit for screening mammogram    Hypertension     Hypothyroidism     Influenza due to other identified influenza virus with other respiratory manifestations 2017    Influenza A    Mild cervical dysplasia     Dysplasia of cervix, low grade (LISA 1)    Other mucopurulent conjunctivitis, unspecified eye 10/08/2014    Pink eye    Other specified postprocedural states     History of loop electrical excision procedure (LEEP)    Papillomavirus as the cause of diseases classified elsewhere     HPV in female    Personal history of other complications of pregnancy, childbirth and the puerperium     History of miscarriage    Personal history of other diseases of the nervous system and sense organs 2014    History of tension headache    Personal history of other endocrine, nutritional and metabolic disease  10/07/2019    History of hyperglycemia    Personal history of other infectious and parasitic diseases     History of HPV infection    Personal history of other malignant neoplasm of skin     History of basal cell carcinoma (BCC)    Personal history of other specified conditions 2014    History of headache    Personal history of other specified conditions 2013    History of fatigue       Past Surgical History:   Procedure Laterality Date    BREAST BIOPSY Left     benign    CERVICAL BIOPSY  W/ LOOP ELECTRODE EXCISION  2017    Cervical Loop Electrosurgical Excision (LEEP)     SECTION, CLASSIC  2017     Section    CHOLECYSTECTOMY  2013    Cholecystectomy Laparoscopic    COLONOSCOPY  2017    Colonoscopy (Fiberoptic)    CT ANGIO NECK  2017    CT NECK ANGIO W AND WO IV CONTRAST 2017 [facility]    CT HEAD ANGIO W AND WO IV CONTRAST  2017    CT HEAD ANGIO W AND WO IV CONTRAST 2017 [facility]    HYSTEROSCOPY  2017    Hysteroscopy With Endometrial Ablation    LAPAROSCOPY DIAGNOSTIC / BIOPSY / ASPIRATION / LYSIS  2017    Exploratory Laparoscopy    OTHER SURGICAL HISTORY  2017    Colposcopy Cervix With Biopsy(S)    THYROIDECTOMY      TONSILLECTOMY  2017    Tonsillectomy     Social History     Tobacco Use    Smoking status: Former     Types: Cigarettes     Start date:      Quit date:      Years since quittin.5    Smokeless tobacco: Never    Tobacco comments:     LIGHT USE   Substance Use Topics    Alcohol use: Yes     Alcohol/week: 3.0 standard drinks of alcohol     Types: 3 Standard drinks or equivalent per week     Comment: occasional     Social History     Substance and Sexual Activity   Drug Use Never           Family History   Problem Relation Name Age of Onset    Diabetes Mother      Thyroid disease Mother      Heart disease Father      Diabetes Father      Bone cancer Father       Hyperthyroidism Brother      Thyroid cancer Cousin         No Known Allergies    Current Outpatient Medications   Medication Sig Dispense Refill    carvedilol (Coreg) 12.5 mg tablet TAKE 1 TABLET (12.5 MG) BY MOUTH 2 TIMES A DAY WITH MEALS. 180 tablet 0    cholecalciferol (Vitamin D3) 25 MCG (1000 UT) capsule Take 4 capsules (100 mcg) by mouth once daily.      citalopram (CeleXA) 10 mg tablet Take 1 tablet (10 mg) by mouth once daily. 90 tablet 0    levothyroxine (Synthroid, Levoxyl) 125 mcg tablet TAKE 1 TABLET BY MOUTH ONCE DAILY 90 tablet 3    omeprazole (PriLOSEC) 40 mg DR capsule Take 1 capsule (40 mg) by mouth once daily in the morning. Take before meals. Do not crush or chew. (Patient taking differently: Take 1 capsule (40 mg) by mouth once daily as needed. Do not crush or chew.) 90 capsule 0     No current facility-administered medications for this visit.         Review of Systems   Constitutional: Negative.    HENT: Negative.     Eyes: Negative.    Respiratory: Negative.     Cardiovascular: Negative.    Gastrointestinal: Negative.    Endocrine: Negative.    Genitourinary: Negative.    Musculoskeletal: Negative.    Skin: Negative.    Allergic/Immunologic: Negative.    Neurological:  Positive for numbness (bilateral hands and wrist).   Hematological: Negative.    Psychiatric/Behavioral: Negative.                Physical Exam  Vitals reviewed.   Constitutional:       Appearance: Normal appearance.   HENT:      Head: Normocephalic and atraumatic.      Nose: Nose normal.      Mouth/Throat:      Mouth: Mucous membranes are moist.      Pharynx: Oropharynx is clear.   Eyes:      Extraocular Movements: Extraocular movements intact.      Conjunctiva/sclera: Conjunctivae normal.      Pupils: Pupils are equal, round, and reactive to light.   Cardiovascular:      Rate and Rhythm: Normal rate and regular rhythm.      Pulses: Normal pulses.      Heart sounds: Normal heart sounds.   Pulmonary:      Effort: Pulmonary effort  "is normal.      Breath sounds: Normal breath sounds.   Abdominal:      General: Bowel sounds are normal.      Palpations: Abdomen is soft.   Genitourinary:     Comments: Assessment referred to physician  Musculoskeletal:         General: Normal range of motion.      Cervical back: Normal range of motion.   Skin:     General: Skin is warm and dry.   Neurological:      General: No focal deficit present.      Mental Status: She is alert and oriented to person, place, and time.   Psychiatric:         Mood and Affect: Mood normal.         Behavior: Behavior normal.         Thought Content: Thought content normal.         Judgment: Judgment normal.          PAT AIRWAY:   Airway:     Mallampati::  II    TM distance::  >3 FB    Neck ROM::  Full  normal      /87   Pulse 65   Temp 36.6 °C (97.9 °F) (Temporal)   Resp 16   Ht 1.6 m (5' 3\")   Wt 62.6 kg (138 lb)   SpO2 98%   BMI 24.45 kg/m²     ASA: 2  KAREN: 2.8%  RCRI: 0.4%      DASI Risk Score    No data to display       Caprini DVT Assessment    No data to display       Modified Frailty Index    No data to display       CHADS2 Stroke Risk  Current as of 33 minutes ago        N/A 3 to 100%: High Risk   2 to < 3%: Medium Risk   0 to < 2%: Low Risk     Last Change: N/A          This score determines the patient's risk of having a stroke if the patient has atrial fibrillation.        This score is not applicable to this patient. Components are not calculated.          Revised Cardiac Risk Index      Flowsheet Row Most Recent Value   Revised Cardiac Risk Calculator 0          Apfel Simplified Score    No data to display       Risk Analysis Index Results This Encounter    No data found in the last 1 encounters.       Stop Bang Score      Flowsheet Row Most Recent Value   Do you snore loudly? 0   Do you often feel tired or fatigued after your sleep? 0   Has anyone ever observed you stop breathing in your sleep? 0   Do you have or are you being treated for high blood " pressure? 1   Recent BMI (Calculated) 23.7   Is BMI greater than 35 kg/m2? 0=No   Age older than 50 years old? 1=Yes   Is your neck circumference greater than 17 inches (Male) or 16 inches (Female)? 0   Gender - Male 0=No   STOP-BANG Total Score 2            Assessment and Plan:     1.Carpal tunnel syndrome, bilateral : Decompression Median Nerve with Carpal Tunnel Release Left  2. Thyroid cancer: thyroidectomy 2019. Patient taking levothyroxine  Last TSH 1.39  6/5/24  3. HTN: Carvedilol  4. Anxiety/Depression: Leslie Cedeno, APRN-CNP

## 2024-07-26 NOTE — PREPROCEDURE INSTRUCTIONS
Why must I stop eating and drinking near surgery time?  With sedation, food or liquid in your stomach can enter your lungs causing serious complications  Increases nausea and vomiting    When do I need to stop eating and drinking before my surgery?   Do not eat or drink after midnight the night before your surgery/procedure.  You may have small sips of water to take your medication.    PAT DISCHARGE INSTRUCTIONS    Please call the Same Day Surgery (SDS) Department of the hospital where your procedure will be performed after 2:00 PM the day before your surgery. If you are scheduled on a Monday, or a Tuesday following a Monday holiday, you will need to call on the last business day prior to your surgery.      [facility]  [address]  [place] [id number]  [phone]    Please let your surgeon know if:      You develop any open sores, shingles, burning or painful urination as these may increase your risk of an infection.   You no longer wish to have the surgery.   Any other personal circumstances change that may lead to the need to cancel or defer this surgery-such as being sick or getting admitted to any hospital within one week of your planned procedure.    Your contact details change, such as a change of address or phone number.    Starting now:     Please DO NOT drink alcohol or smoke for 24 hours before surgery. It is well known that quitting smoking can make a huge difference to your health and recovery from surgery. The longer you abstain from smoking, the better your chances of a healthy recovery. If you need help with quitting, call 8-651-QUIT-NOW to be connected to a trained counselor who will discuss the best methods to help you quit.     Before your surgery:    Please stop all supplements 7 days prior to surgery. Or as directed by your surgeon.   Please stop taking NSAID pain medicine such as Advil and Motrin 7 days before surgery.    If you develop any  Physical Therapy IRP Treatment     Primary Rehabilitation Diagnosis: central cord syndrome        Planned Discharge Destination: Home     SUBJECTIVE: Subjective: \"I wanna do whatever you think I should do\" (02/01/22 1300)   Subjective/Objective Comments: B sanchez hose donmorgan during session, pt refused abdominal binder for first half of session  (02/01/22 1300)   Patient's Personal Goal: \"to get back walking, self sufficient\" \"to cut hair again\"     OBJECTIVE:  Precautions  Neck Brace: At all times (01/31/22 0745)  Cervical Precautions: Yes (01/31/22 0745)  Lumbar Precautions: Yes (01/31/22 0745)  Seizure Precautions: Yes (01/31/22 0745)  Other Precautions: Level 2 mask and full face shield worn by therapist. (02/01/22 1045)  Precautions Comments: B SANCHEZ hose donned (01/31/22 0745)        See below for current functional status overview.  See PT flowsheet for full details regarding the PT therapy provided.    ASSESSMENT:   Treatment today focused on increasing tolerance to upright, vitals monitoring, transfer training, and initiating NMR to facilitate trunk control/dyn balance/R UE motor activation.  Patient is demonstrating good progress supported by progression of SL>sit from max a to now mod a and from max a x 2 SBT to now mod a x 1 + min a x 1 squat pivot transfer.    Patient limited at this time by dec tolerance to upright requiring frequent prolonged reclined rest breaks on wedges.  Patient will benefit from further skilled PT  for continued training with all functional mobility and increasing upright tolerance to help the patient meet goal of min a.     PT Identified Barriers to Discharge: Intermittent muscle spasms, neck and RUE pain     This patient participated in all scheduled physical therapy time with this therapist today.    Education:     Education Provided  On this date, education was provided to patient regarding bed mobility and transfers.  The response to education was/were: Verbalizes understanding,  Demonstrates understanding and Needs reinforcement.      LONG TERM GOALS:    Bed Mobility Discharge Goal: Pt to perform all bed mobility mod I   Transfer Discharge Goal: Pt to perform bed<>chair transfer with LRAD at min a    PLAN:   Continue skilled PT, including the following Treatment/Interventions: Functional transfer training;Strengthening;ROM;Endurance training;Patient/Family training;Equipment eval/education;Bed mobility;Gait training;Continued evaluation;Wheelchair mobility;Stairs retraining;Safety Education;Neuromuscular re-education (01/28/22 1045)    ,                     RECOMMENDATIONS FOR DISCHARGE:  Recommendation for Discharge: PT IL: Patient requires 24 HOUR assistance to perform mobility and/or ADLs safely, Patient is appropriate for Physical Therapy 1-3 times per week, Other (comment) (home health vs OP neuro)     PT/OT Mobility Equipment for Discharge: tbd (01/28/22 1045)   PT/OT ADL Equipment for Discharge: TBD; anticipate TTB, BSC (01/31/22 0369)       FUNCTIONAL DATA OVERVIEW LAST 24 HOURS  Bed Mobility   Bed Mobility  Rolling to the Right: Supervision (Supv) (using bed rails ) (02/01/22 1300)  Supine to Sit: Moderate Assist (Mod) (R SL>sit, A for trunk leverage ) (02/01/22 1300)  Bed Mobility Comments: A/cues to facilitate log roll technique and use of L LE to assist R LE (02/01/22 1300)     Transfers  Transfers  Squat Pivot Transfers: Total Assist - Non-dependent (fluctuates mod a x 1 + min a x 1 to mod a x 1 ) (02/01/22 1300)  Transfer Comments 1: squat pivot, x 3 reps towards L, level surfaces, A/cues to facilitate trunk control, hip leverage, dyn balance (02/01/22 1300)     Gait   ,      Stairs        Wheelchair Mobility        Balance  Balance  Sitting - Static: Supervision (Supv) (sba) (02/01/22 1300)  Sitting - Dynamic: Minimal Assist (Min) (cga) (02/01/22 1300)     Neuromuscular Re-education  Neuromuscular Re-education  Neuromuscular Re-education 1: short sitting EOM, R UE supported  fever, cough, cold, rashes, cuts, scratches, scrapes, urinary symptoms or infection anywhere on your body (including teeth and gums) prior to surgery, please call your surgeon’s office as soon as possible. This may require treatment to reduce the chance of cancellation on the day of surgery.    The day before your surgery:   DIET- Please follow the diet instructions at the top of your packet.   Get a good night’s rest.  Use the special soap for bathing if you have been instructed to use one.    Scheduled surgery times may change and you will be notified if this occurs - please check your personal voicemail for any updates.     On the morning of surgery:   Wear comfortable, loose fitting clothes which open in the front. Please do not wear moisturizers, creams, lotions, makeup or perfume.    Please bring with you to surgery:   Photo ID and insurance card   Current list of medicines and allergies   Pacemaker/ Defibrillator/Heart stent cards   CPAP machine and mask    Slings/ splints/ crutches   A copy of your complete advanced directive/DHPOA.    Please do NOT bring with you to surgery:   All jewelry and valuables should be left at home.   Prosthetic devices such as contact lenses, hearing aids, dentures, eyelash extensions, hairpins and body piercings must be removed prior to going in to the surgical suite.    After outpatient surgery:   A responsible adult MUST accompany you at the time of discharge and stay with you for 24 hours after your surgery. You may NOT drive yourself home after surgery.    Do not drive, operate machinery, make critical decisions or do activities that require co-ordination or balance until after a night’s sleep.   Do not drink alcoholic beverages for 24 hours.   Instructions for resuming your medications will be provided by your surgeon.    CALL YOUR DOCTOR AFTER SURGERY IF YOU HAVE:     Chills and/or a fever of 101° F or higher.    Redness, swelling, pus or drainage from your surgical wound  on wedges/pillows performing L UE reaching towards L to facilitate R trunk activation x 4 reps with sba and mod tactile cues for mm activation  (02/01/22 1300)  Neuromuscular Re-education 2: short sitting EOM, performing R forearm pushup to midline with min a to facilitate scapular depression/tricep ext and anterior w/s x 5 reps  (02/01/22 1300)     Other Therapeutic Interventions        Additional Assessments Completed  na    At the end of the therapy session, patient is in wheelchair with the following safety measures in place: alarm system in place/re-engaged, call light within reach, equipment intact and lines intact.    Patient is located in the patient room and was handed off to RN. Patient's family was not present. .   or a bad smell from the wound.    Lightheadedness, fainting or confusion.    Persistent vomiting (throwing up) and are not able to eat or drink for 12 hours.    Three or more loose, watery bowel movements in 24 hours (diarrhea).   Difficulty or pain while urinating( after non-urological surgery)    Pain and swelling in your legs, especially if it is only on one side.    Difficulty breathing or are breathing faster than normal.    Any new concerning symptoms.     Medication List            Accurate as of July 26, 2024  2:31 PM. Always use your most recent med list.                carvedilol 12.5 mg tablet  Commonly known as: Coreg  TAKE 1 TABLET (12.5 MG) BY MOUTH 2 TIMES A DAY WITH MEALS.  Medication Adjustments for Surgery: Take morning of surgery with sip of water, no other fluids     citalopram 10 mg tablet  Commonly known as: CeleXA  Take 1 tablet (10 mg) by mouth once daily.  Notes to patient: Take evening dose before surgery.     levothyroxine 125 mcg tablet  Commonly known as: Synthroid, Levoxyl  TAKE 1 TABLET BY MOUTH ONCE DAILY  Medication Adjustments for Surgery: Take morning of surgery with sip of water, no other fluids     omeprazole 40 mg DR capsule  Commonly known as: PriLOSEC  Take 1 capsule (40 mg) by mouth once daily in the morning. Take before meals. Do not crush or chew.  Notes to patient: Take as needed.     Vitamin D3 25 MCG (1000 UT) capsule  Generic drug: cholecalciferol  Medication Adjustments for Surgery: Stop 7 days before surgery

## 2024-07-26 NOTE — H&P (VIEW-ONLY)
CPM/PAT Evaluation       Name: Jessica Francois (Jessica Francois)  /Age: 1970/54 y.o.     In-Person       Chief Complaint: Carpel tunnel    HPI Jessica Francois is a 54 year old female in with complaints of bilateral hand numbness since the end of last year. She states the left hand is worse than the right. She stated the numbness and tingling is worse at night time. She admits to having occasional sharp pain in the wrist area. 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 She has tried using wrist braces with no relief. She denies fever, chills, nausea, vomiting, chest pain, sob, and palpitations. She is scheduled for a decompression median nerve carpel tunnel release of the left hand    Past Medical History:   Diagnosis Date    Allergic contact dermatitis due to plants, except food 08/15/2017    Poison ivy dermatitis    Anxiety disorder, unspecified 2013    Anxiety    Cellulitis, unspecified 2016    Cellulitis    Complete or unspecified spontaneous  without complication ([facility])         Encounter for screening mammogram for malignant neoplasm of breast 2017    Visit for screening mammogram    Hypertension     Hypothyroidism     Influenza due to other identified influenza virus with other respiratory manifestations 2017    Influenza A    Mild cervical dysplasia     Dysplasia of cervix, low grade (LISA 1)    Other mucopurulent conjunctivitis, unspecified eye 10/08/2014    Pink eye    Other specified postprocedural states     History of loop electrical excision procedure (LEEP)    Papillomavirus as the cause of diseases classified elsewhere     HPV in female    Personal history of other complications of pregnancy, childbirth and the puerperium     History of miscarriage    Personal history of other diseases of the nervous system and sense organs 2014    History of tension headache    Personal history of other endocrine, nutritional and metabolic disease  10/07/2019    History of hyperglycemia    Personal history of other infectious and parasitic diseases     History of HPV infection    Personal history of other malignant neoplasm of skin     History of basal cell carcinoma (BCC)    Personal history of other specified conditions 2014    History of headache    Personal history of other specified conditions 2013    History of fatigue       Past Surgical History:   Procedure Laterality Date    BREAST BIOPSY Left     benign    CERVICAL BIOPSY  W/ LOOP ELECTRODE EXCISION  2017    Cervical Loop Electrosurgical Excision (LEEP)     SECTION, CLASSIC  2017     Section    CHOLECYSTECTOMY  2013    Cholecystectomy Laparoscopic    COLONOSCOPY  2017    Colonoscopy (Fiberoptic)    CT ANGIO NECK  2017    CT NECK ANGIO W AND WO IV CONTRAST 2017 [facility]    CT HEAD ANGIO W AND WO IV CONTRAST  2017    CT HEAD ANGIO W AND WO IV CONTRAST 2017 [facility]    HYSTEROSCOPY  2017    Hysteroscopy With Endometrial Ablation    LAPAROSCOPY DIAGNOSTIC / BIOPSY / ASPIRATION / LYSIS  2017    Exploratory Laparoscopy    OTHER SURGICAL HISTORY  2017    Colposcopy Cervix With Biopsy(S)    THYROIDECTOMY      TONSILLECTOMY  2017    Tonsillectomy     Social History     Tobacco Use    Smoking status: Former     Types: Cigarettes     Start date:      Quit date:      Years since quittin.5    Smokeless tobacco: Never    Tobacco comments:     LIGHT USE   Substance Use Topics    Alcohol use: Yes     Alcohol/week: 3.0 standard drinks of alcohol     Types: 3 Standard drinks or equivalent per week     Comment: occasional     Social History     Substance and Sexual Activity   Drug Use Never           Family History   Problem Relation Name Age of Onset    Diabetes Mother      Thyroid disease Mother      Heart disease Father      Diabetes Father      Bone cancer Father       Hyperthyroidism Brother      Thyroid cancer Cousin         No Known Allergies    Current Outpatient Medications   Medication Sig Dispense Refill    carvedilol (Coreg) 12.5 mg tablet TAKE 1 TABLET (12.5 MG) BY MOUTH 2 TIMES A DAY WITH MEALS. 180 tablet 0    cholecalciferol (Vitamin D3) 25 MCG (1000 UT) capsule Take 4 capsules (100 mcg) by mouth once daily.      citalopram (CeleXA) 10 mg tablet Take 1 tablet (10 mg) by mouth once daily. 90 tablet 0    levothyroxine (Synthroid, Levoxyl) 125 mcg tablet TAKE 1 TABLET BY MOUTH ONCE DAILY 90 tablet 3    omeprazole (PriLOSEC) 40 mg DR capsule Take 1 capsule (40 mg) by mouth once daily in the morning. Take before meals. Do not crush or chew. (Patient taking differently: Take 1 capsule (40 mg) by mouth once daily as needed. Do not crush or chew.) 90 capsule 0     No current facility-administered medications for this visit.         Review of Systems   Constitutional: Negative.    HENT: Negative.     Eyes: Negative.    Respiratory: Negative.     Cardiovascular: Negative.    Gastrointestinal: Negative.    Endocrine: Negative.    Genitourinary: Negative.    Musculoskeletal: Negative.    Skin: Negative.    Allergic/Immunologic: Negative.    Neurological:  Positive for numbness (bilateral hands and wrist).   Hematological: Negative.    Psychiatric/Behavioral: Negative.                Physical Exam  Vitals reviewed.   Constitutional:       Appearance: Normal appearance.   HENT:      Head: Normocephalic and atraumatic.      Nose: Nose normal.      Mouth/Throat:      Mouth: Mucous membranes are moist.      Pharynx: Oropharynx is clear.   Eyes:      Extraocular Movements: Extraocular movements intact.      Conjunctiva/sclera: Conjunctivae normal.      Pupils: Pupils are equal, round, and reactive to light.   Cardiovascular:      Rate and Rhythm: Normal rate and regular rhythm.      Pulses: Normal pulses.      Heart sounds: Normal heart sounds.   Pulmonary:      Effort: Pulmonary effort  "is normal.      Breath sounds: Normal breath sounds.   Abdominal:      General: Bowel sounds are normal.      Palpations: Abdomen is soft.   Genitourinary:     Comments: Assessment referred to physician  Musculoskeletal:         General: Normal range of motion.      Cervical back: Normal range of motion.   Skin:     General: Skin is warm and dry.   Neurological:      General: No focal deficit present.      Mental Status: She is alert and oriented to person, place, and time.   Psychiatric:         Mood and Affect: Mood normal.         Behavior: Behavior normal.         Thought Content: Thought content normal.         Judgment: Judgment normal.          PAT AIRWAY:   Airway:     Mallampati::  II    TM distance::  >3 FB    Neck ROM::  Full  normal      /87   Pulse 65   Temp 36.6 °C (97.9 °F) (Temporal)   Resp 16   Ht 1.6 m (5' 3\")   Wt 62.6 kg (138 lb)   SpO2 98%   BMI 24.45 kg/m²     ASA: 2  KAREN: 2.8%  RCRI: 0.4%      DASI Risk Score    No data to display       Caprini DVT Assessment    No data to display       Modified Frailty Index    No data to display       CHADS2 Stroke Risk  Current as of 33 minutes ago        N/A 3 to 100%: High Risk   2 to < 3%: Medium Risk   0 to < 2%: Low Risk     Last Change: N/A          This score determines the patient's risk of having a stroke if the patient has atrial fibrillation.        This score is not applicable to this patient. Components are not calculated.          Revised Cardiac Risk Index      Flowsheet Row Most Recent Value   Revised Cardiac Risk Calculator 0          Apfel Simplified Score    No data to display       Risk Analysis Index Results This Encounter    No data found in the last 1 encounters.       Stop Bang Score      Flowsheet Row Most Recent Value   Do you snore loudly? 0   Do you often feel tired or fatigued after your sleep? 0   Has anyone ever observed you stop breathing in your sleep? 0   Do you have or are you being treated for high blood " pressure? 1   Recent BMI (Calculated) 23.7   Is BMI greater than 35 kg/m2? 0=No   Age older than 50 years old? 1=Yes   Is your neck circumference greater than 17 inches (Male) or 16 inches (Female)? 0   Gender - Male 0=No   STOP-BANG Total Score 2            Assessment and Plan:     1.Carpal tunnel syndrome, bilateral : Decompression Median Nerve with Carpal Tunnel Release Left  2. Thyroid cancer: thyroidectomy 2019. Patient taking levothyroxine  Last TSH 1.39  6/5/24  3. HTN: Carvedilol  4. Anxiety/Depression: Leslie Cedeno, APRN-CNP

## 2024-07-28 ASSESSMENT — ENCOUNTER SYMPTOMS
FATIGUE: 0
NECK PAIN: 0
TREMORS: 0
NAUSEA: 0
UNEXPECTED WEIGHT CHANGE: 0
SHORTNESS OF BREATH: 0
CONSTIPATION: 0
HEADACHES: 0
TROUBLE SWALLOWING: 0
VOMITING: 0
DIARRHEA: 0
FEVER: 0
NERVOUS/ANXIOUS: 0
NUMBNESS: 1
PALPITATIONS: 0
WEAKNESS: 0
ABDOMINAL PAIN: 0

## 2024-08-06 ENCOUNTER — PHARMACY VISIT (OUTPATIENT)
Dept: PHARMACY | Facility: CLINIC | Age: 54
End: 2024-08-06
Payer: COMMERCIAL

## 2024-08-06 PROCEDURE — RXMED WILLOW AMBULATORY MEDICATION CHARGE

## 2024-08-14 ENCOUNTER — ANESTHESIA EVENT (OUTPATIENT)
Dept: OPERATING ROOM | Facility: HOSPITAL | Age: 54
End: 2024-08-14
Payer: COMMERCIAL

## 2024-08-14 ENCOUNTER — ANESTHESIA (OUTPATIENT)
Dept: OPERATING ROOM | Facility: HOSPITAL | Age: 54
End: 2024-08-14
Payer: COMMERCIAL

## 2024-08-14 ENCOUNTER — HOSPITAL ENCOUNTER (OUTPATIENT)
Facility: HOSPITAL | Age: 54
Setting detail: OUTPATIENT SURGERY
Discharge: HOME | End: 2024-08-14
Attending: ORTHOPAEDIC SURGERY | Admitting: ORTHOPAEDIC SURGERY
Payer: COMMERCIAL

## 2024-08-14 VITALS
RESPIRATION RATE: 18 BRPM | OXYGEN SATURATION: 95 % | DIASTOLIC BLOOD PRESSURE: 86 MMHG | WEIGHT: 138.45 LBS | HEIGHT: 63 IN | SYSTOLIC BLOOD PRESSURE: 120 MMHG | HEART RATE: 74 BPM | BODY MASS INDEX: 24.53 KG/M2 | TEMPERATURE: 97.7 F

## 2024-08-14 DIAGNOSIS — G56.03 CARPAL TUNNEL SYNDROME, BILATERAL: Primary | ICD-10-CM

## 2024-08-14 PROCEDURE — A64721 PR REVISE MEDIAN N/CARPAL TUNNEL SURG: Performed by: NURSE ANESTHETIST, CERTIFIED REGISTERED

## 2024-08-14 PROCEDURE — 2500000001 HC RX 250 WO HCPCS SELF ADMINISTERED DRUGS (ALT 637 FOR MEDICARE OP): Performed by: ORTHOPAEDIC SURGERY

## 2024-08-14 PROCEDURE — 64721 CARPAL TUNNEL SURGERY: CPT | Performed by: ORTHOPAEDIC SURGERY

## 2024-08-14 PROCEDURE — 2500000005 HC RX 250 GENERAL PHARMACY W/O HCPCS: Performed by: ORTHOPAEDIC SURGERY

## 2024-08-14 PROCEDURE — RXMED WILLOW AMBULATORY MEDICATION CHARGE

## 2024-08-14 PROCEDURE — 2500000004 HC RX 250 GENERAL PHARMACY W/ HCPCS (ALT 636 FOR OP/ED): Mod: JZ | Performed by: PHYSICIAN ASSISTANT

## 2024-08-14 PROCEDURE — A64721 PR REVISE MEDIAN N/CARPAL TUNNEL SURG: Performed by: STUDENT IN AN ORGANIZED HEALTH CARE EDUCATION/TRAINING PROGRAM

## 2024-08-14 PROCEDURE — 2500000004 HC RX 250 GENERAL PHARMACY W/ HCPCS (ALT 636 FOR OP/ED): Performed by: NURSE ANESTHETIST, CERTIFIED REGISTERED

## 2024-08-14 PROCEDURE — 3700000001 HC GENERAL ANESTHESIA TIME - INITIAL BASE CHARGE: Performed by: ORTHOPAEDIC SURGERY

## 2024-08-14 PROCEDURE — 2500000004 HC RX 250 GENERAL PHARMACY W/ HCPCS (ALT 636 FOR OP/ED): Performed by: ORTHOPAEDIC SURGERY

## 2024-08-14 PROCEDURE — 7100000009 HC PHASE TWO TIME - INITIAL BASE CHARGE: Performed by: ORTHOPAEDIC SURGERY

## 2024-08-14 PROCEDURE — 7100000010 HC PHASE TWO TIME - EACH INCREMENTAL 1 MINUTE: Performed by: ORTHOPAEDIC SURGERY

## 2024-08-14 PROCEDURE — 7100000002 HC RECOVERY ROOM TIME - EACH INCREMENTAL 1 MINUTE: Performed by: ORTHOPAEDIC SURGERY

## 2024-08-14 PROCEDURE — 3600000003 HC OR TIME - INITIAL BASE CHARGE - PROCEDURE LEVEL THREE: Performed by: ORTHOPAEDIC SURGERY

## 2024-08-14 PROCEDURE — 7100000001 HC RECOVERY ROOM TIME - INITIAL BASE CHARGE: Performed by: ORTHOPAEDIC SURGERY

## 2024-08-14 PROCEDURE — 3700000002 HC GENERAL ANESTHESIA TIME - EACH INCREMENTAL 1 MINUTE: Performed by: ORTHOPAEDIC SURGERY

## 2024-08-14 PROCEDURE — 3600000008 HC OR TIME - EACH INCREMENTAL 1 MINUTE - PROCEDURE LEVEL THREE: Performed by: ORTHOPAEDIC SURGERY

## 2024-08-14 RX ORDER — FENTANYL CITRATE 50 UG/ML
50 INJECTION, SOLUTION INTRAMUSCULAR; INTRAVENOUS EVERY 5 MIN PRN
Status: DISCONTINUED | OUTPATIENT
Start: 2024-08-14 | End: 2024-08-14 | Stop reason: HOSPADM

## 2024-08-14 RX ORDER — MEPERIDINE HYDROCHLORIDE 25 MG/ML
12.5 INJECTION INTRAMUSCULAR; INTRAVENOUS; SUBCUTANEOUS EVERY 10 MIN PRN
Status: DISCONTINUED | OUTPATIENT
Start: 2024-08-14 | End: 2024-08-14 | Stop reason: HOSPADM

## 2024-08-14 RX ORDER — ONDANSETRON HYDROCHLORIDE 2 MG/ML
4 INJECTION, SOLUTION INTRAVENOUS ONCE AS NEEDED
Status: DISCONTINUED | OUTPATIENT
Start: 2024-08-14 | End: 2024-08-14 | Stop reason: HOSPADM

## 2024-08-14 RX ORDER — ALBUTEROL SULFATE 0.83 MG/ML
2.5 SOLUTION RESPIRATORY (INHALATION) ONCE AS NEEDED
Status: DISCONTINUED | OUTPATIENT
Start: 2024-08-14 | End: 2024-08-14 | Stop reason: HOSPADM

## 2024-08-14 RX ORDER — PROCHLORPERAZINE EDISYLATE 5 MG/ML
5 INJECTION INTRAMUSCULAR; INTRAVENOUS ONCE AS NEEDED
Status: DISCONTINUED | OUTPATIENT
Start: 2024-08-14 | End: 2024-08-14 | Stop reason: HOSPADM

## 2024-08-14 RX ORDER — LIDOCAINE HYDROCHLORIDE 10 MG/ML
INJECTION INFILTRATION; PERINEURAL AS NEEDED
Status: DISCONTINUED | OUTPATIENT
Start: 2024-08-14 | End: 2024-08-14 | Stop reason: HOSPADM

## 2024-08-14 RX ORDER — BUPIVACAINE HYDROCHLORIDE 5 MG/ML
INJECTION, SOLUTION PERINEURAL AS NEEDED
Status: DISCONTINUED | OUTPATIENT
Start: 2024-08-14 | End: 2024-08-14 | Stop reason: HOSPADM

## 2024-08-14 RX ORDER — FENTANYL CITRATE 50 UG/ML
25 INJECTION, SOLUTION INTRAMUSCULAR; INTRAVENOUS EVERY 5 MIN PRN
Status: DISCONTINUED | OUTPATIENT
Start: 2024-08-14 | End: 2024-08-14 | Stop reason: HOSPADM

## 2024-08-14 RX ORDER — PROPOFOL 10 MG/ML
INJECTION, EMULSION INTRAVENOUS AS NEEDED
Status: DISCONTINUED | OUTPATIENT
Start: 2024-08-14 | End: 2024-08-14

## 2024-08-14 RX ORDER — KETOROLAC TROMETHAMINE 30 MG/ML
INJECTION, SOLUTION INTRAMUSCULAR; INTRAVENOUS AS NEEDED
Status: DISCONTINUED | OUTPATIENT
Start: 2024-08-14 | End: 2024-08-14

## 2024-08-14 RX ORDER — SODIUM CHLORIDE, SODIUM LACTATE, POTASSIUM CHLORIDE, CALCIUM CHLORIDE 600; 310; 30; 20 MG/100ML; MG/100ML; MG/100ML; MG/100ML
100 INJECTION, SOLUTION INTRAVENOUS CONTINUOUS
Status: DISCONTINUED | OUTPATIENT
Start: 2024-08-14 | End: 2024-08-14 | Stop reason: HOSPADM

## 2024-08-14 RX ORDER — CEFAZOLIN SODIUM 2 G/100ML
2 INJECTION, SOLUTION INTRAVENOUS ONCE
Status: COMPLETED | OUTPATIENT
Start: 2024-08-14 | End: 2024-08-14

## 2024-08-14 RX ORDER — HYDRALAZINE HYDROCHLORIDE 20 MG/ML
5 INJECTION INTRAMUSCULAR; INTRAVENOUS EVERY 30 MIN PRN
Status: DISCONTINUED | OUTPATIENT
Start: 2024-08-14 | End: 2024-08-14 | Stop reason: HOSPADM

## 2024-08-14 RX ORDER — FENTANYL CITRATE 50 UG/ML
INJECTION, SOLUTION INTRAMUSCULAR; INTRAVENOUS AS NEEDED
Status: DISCONTINUED | OUTPATIENT
Start: 2024-08-14 | End: 2024-08-14

## 2024-08-14 RX ORDER — MIDAZOLAM HYDROCHLORIDE 1 MG/ML
INJECTION, SOLUTION INTRAMUSCULAR; INTRAVENOUS AS NEEDED
Status: DISCONTINUED | OUTPATIENT
Start: 2024-08-14 | End: 2024-08-14

## 2024-08-14 RX ORDER — LABETALOL HYDROCHLORIDE 5 MG/ML
5 INJECTION, SOLUTION INTRAVENOUS ONCE AS NEEDED
Status: DISCONTINUED | OUTPATIENT
Start: 2024-08-14 | End: 2024-08-14 | Stop reason: HOSPADM

## 2024-08-14 RX ORDER — BACITRACIN 500 [USP'U]/G
OINTMENT TOPICAL AS NEEDED
Status: DISCONTINUED | OUTPATIENT
Start: 2024-08-14 | End: 2024-08-14 | Stop reason: HOSPADM

## 2024-08-14 RX ORDER — DIPHENHYDRAMINE HYDROCHLORIDE 50 MG/ML
12.5 INJECTION INTRAMUSCULAR; INTRAVENOUS ONCE AS NEEDED
Status: DISCONTINUED | OUTPATIENT
Start: 2024-08-14 | End: 2024-08-14 | Stop reason: HOSPADM

## 2024-08-14 RX ORDER — IPRATROPIUM BROMIDE 0.5 MG/2.5ML
500 SOLUTION RESPIRATORY (INHALATION) AS NEEDED
Status: DISCONTINUED | OUTPATIENT
Start: 2024-08-14 | End: 2024-08-14 | Stop reason: HOSPADM

## 2024-08-14 RX ORDER — DOXYCYCLINE 100 MG/1
100 CAPSULE ORAL 2 TIMES DAILY
Qty: 14 CAPSULE | Refills: 0 | Status: SHIPPED | OUTPATIENT
Start: 2024-08-14 | End: 2024-08-23

## 2024-08-14 ASSESSMENT — PAIN - FUNCTIONAL ASSESSMENT
PAIN_FUNCTIONAL_ASSESSMENT: 0-10

## 2024-08-14 ASSESSMENT — PAIN SCALES - GENERAL
PAINLEVEL_OUTOF10: 0 - NO PAIN
PAINLEVEL_OUTOF10: 0 - NO PAIN
PAINLEVEL_OUTOF10: 2
PAINLEVEL_OUTOF10: 0 - NO PAIN

## 2024-08-14 ASSESSMENT — COLUMBIA-SUICIDE SEVERITY RATING SCALE - C-SSRS
6. HAVE YOU EVER DONE ANYTHING, STARTED TO DO ANYTHING, OR PREPARED TO DO ANYTHING TO END YOUR LIFE?: NO
2. HAVE YOU ACTUALLY HAD ANY THOUGHTS OF KILLING YOURSELF?: NO
1. IN THE PAST MONTH, HAVE YOU WISHED YOU WERE DEAD OR WISHED YOU COULD GO TO SLEEP AND NOT WAKE UP?: NO

## 2024-08-14 NOTE — NURSING NOTE
Patient in Phase 2; dressed and up to chair with RN assist. Tolerating po fluids, no complaint of pain and no complaint of nausea.     Family at bedside; discussed discharge instructions with patient and Family. All questions at this time answered.     Patient clinically appropriate for discharge. IV removed and patient transported to discharge area via wheelchair.

## 2024-08-14 NOTE — DISCHARGE INSTRUCTIONS
You had carpal tunnel surgery today, due to local anesthesia the hand may be numb for a few hours after surgery, this is very normal and should slowly wear off.    Some swelling and bruising in the forearm and palm is very normal after surgery.    Keep dressing on hand for 2-3 days, after that you may take dressing off and put a band-aid over wound.    DO NOT get wound wet for 5 days post-op, after that you can shower/wash hand and get wound wet, pat dry.    If able, take 800mg Ibuprofen and/or Tylenol for pain after surgery if needed.    Use the hand for activities as tolerated, goal is to regain full digital and wrist motion as quickly as possible but avoid heavy lifting until advised.    While some symptoms may improve quickly after surgery, some symptoms take time to slowly improve and the results can be very variable.    Follow up in the office by calling [phone] in 10-13 days for a post-op appt

## 2024-08-14 NOTE — ANESTHESIA POSTPROCEDURE EVALUATION
Patient: Jessica Francois    Procedure Summary       Date: 08/14/24 Room / Location: [facility] / [facility]    Anesthesia Start: 0734 Anesthesia Stop: 0806    Procedure: Decompression Median Nerve with Carpal Tunnel Release (Left: Hand) Diagnosis:       Carpal tunnel syndrome, bilateral      (Carpal tunnel syndrome, bilateral [G56.03])    Surgeons: Nicholas Xiong MD Responsible Provider: Marc Maxwell MD    Anesthesia Type: MAC ASA Status: 2            Anesthesia Type: MAC    Vitals Value Taken Time   /96 08/14/24 0830   Temp 36.5 °C (97.7 °F) 08/14/24 0830   Pulse 78 08/14/24 0830   Resp 20 08/14/24 0830   SpO2 93 % 08/14/24 0830   Vitals shown include unfiled device data.    Anesthesia Post Evaluation    Patient location during evaluation: PACU  Patient participation: complete - patient participated  Level of consciousness: awake  Pain management: adequate  Multimodal analgesia pain management approach  Airway patency: patent  Cardiovascular status: acceptable and hemodynamically stable  Respiratory status: acceptable and spontaneous ventilation  Hydration status: euvolemic  Postoperative Nausea and Vomiting: none  Comments: No nausea or vomiting        No notable events documented.

## 2024-08-14 NOTE — ANESTHESIA PREPROCEDURE EVALUATION
Patient: eJssica Francois    Procedure Information       Date/Time: 08/14/24 0800    Procedure: Decompression Median Nerve with Carpal Tunnel Release (Left: Hand)    Location: [facility] / [facility]    Surgeons: Nicholas Xiong MD            Relevant Problems   Anesthesia (within normal limits)      Cardiac   (+) Hypercholesterolemia   (-) History of coronary artery bypass graft   (-) Pacemaker      Pulmonary   (-) Asthma (HHS-HCC)   (-) Chronic obstructive pulmonary disease (Multi)   (-) CARL (obstructive sleep apnea)      Neuro   (+) Carpal tunnel syndrome, bilateral   (+) ASHLEY (generalized anxiety disorder)   (-) CVA (cerebral vascular accident) (Multi)   (-) Seizures (Multi)      GI   (+) GERD without esophagitis      Endocrine   (+) Malignant neoplasm of thyroid gland (Multi)   (-) Diabetes mellitus, type 2 (Multi)      Hematology   (-) Coagulopathy (Multi)      Musculoskeletal   (+) Carpal tunnel syndrome, bilateral      Skin   (+) Facial basal cell cancer       Clinical information reviewed:   Tobacco  Allergies  Meds   Med Hx  Surg Hx   Fam Hx  Soc Hx        NPO Detail:  NPO/Void Status  Date of Last Liquid: 08/14/24  Time of Last Liquid: 0445  Date of Last Solid: 08/13/24  Time of Last Solid: 1930  Time of Last Void: 0430         Physical Exam    Airway  Mallampati: I  TM distance: >3 FB  Neck ROM: full     Cardiovascular    Dental    Pulmonary    Abdominal            Anesthesia Plan    History of general anesthesia?: yes  History of complications of general anesthesia?: no    ASA 2     MAC     intravenous induction   Postoperative administration of opioids is intended.  Anesthetic plan and risks discussed with patient.

## 2024-08-14 NOTE — PERIOPERATIVE NURSING NOTE
0804 Arrives unresponsive to pacu 1 Good rise and fall of chest and fogging of mask. Left hand dressing dry and intact  brisk capillary refill.     0815 Awakens to physical  Alert times three. Falls back to sleep.    0825 Denies pain and nausea.

## 2024-08-14 NOTE — OP NOTE
Decompression Median Nerve with Carpal Tunnel Release (L) Operative Note     Date: 2024  OR Location: [facility]    Name: Jessica Francois, : 1970, Age: 54 y.o., MRN: [id number], Sex: female    Diagnosis  Pre-op Diagnosis      * Carpal tunnel syndrome, bilateral [G56.03] Post-op Diagnosis     * Carpal tunnel syndrome, bilateral [G56.03]     Procedures  Decompression Median Nerve with Carpal Tunnel Release  [id number] - [place] NEUROPLASTY &/TRANSPOS MEDIAN NRV CARPAL TUNNE    Left open carpal tunnel release  Surgeons      * Nicholas Xiong - Primary    Resident/Fellow/Other Assistant:  Surgeons and Role:  * No surgeons found with a matching role *    Procedure Summary  Anesthesia: Monitor Anesthesia Care  ASA: II  Anesthesia Staff: Anesthesiologist: Marc Maxwell MD  CRNA: JOSE Chavis  Estimated Blood Loss: 1mL  Intra-op Medications: Administrations occurring from 0800 to 0845 on 24:  * No intraprocedure medications in log *           Anesthesia Record               Intraprocedure I/O Totals          Intake    lactated Ringer's infusion 100.00 mL    Total Intake 100 mL          Specimen: No specimens collected     Staff:   Circulator: Margot Oro Person: Pauline         Drains and/or Catheters: * None in log *    Tourniquet Times:     Total Tourniquet Time Documented:  Arm - Upper (Left) - 11 minutes  Total: Arm - Upper (Left) - 11 minutes      Implants:     Findings: Moderate nerve swelling    Indications: Jessica Francois is an 54 y.o. female who is having surgery for Carpal tunnel syndrome, bilateral [G56.03].  Pleasant patient understands the risk involved such as nerve artery tendon damage infection continued pain possibly for future surgery and infection even worsening symptoms understand the goal of the surgery is to decrease compression on the nerve but the amount of recovery can be variable understands postoperative care and watching for any complications    The patient was seen in the  preoperative area. The risks, benefits, complications, treatment options, non-operative alternatives, expected recovery and outcomes were discussed with the patient. The possibilities of reaction to medication, pulmonary aspiration, injury to surrounding structures, bleeding, recurrent infection, the need for additional procedures, failure to diagnose a condition, and creating a complication requiring transfusion or operation were discussed with the patient. The patient concurred with the proposed plan, giving informed consent.  The site of surgery was properly noted/marked if necessary per policy. The patient has been actively warmed in preoperative area. Preoperative antibiotics have been ordered and given within 1 hours of incision. Venous thrombosis prophylaxis are not indicated.    Procedure Details: Pleasant patient brought the operating room after sterilely prepping draping form a timeout we placed abundant local and made a 3 cm incision in line with the radial aspect of the left ring finger over the transverse carpal ligament.  We went down through skin bluntly spreading onto the ligament and then opened up the transverse carpal ligament sharply and released the distal aspect of the superficial fat and proximally with greater than 4 cm of forearm fascia the nerve was swollen but no significant adhesions we copious irrigated closed wound light compressive dressing was placed fingers pinked up nicely no complications  Complications:  None; patient tolerated the procedure well.    Disposition: PACU - hemodynamically stable.  Condition: stable         Additional Details: 0    Attending Attestation: I performed the procedure.    Nicholas Xiong  Phone Number: [phone]

## 2024-08-16 ENCOUNTER — PHARMACY VISIT (OUTPATIENT)
Dept: PHARMACY | Facility: CLINIC | Age: 54
End: 2024-08-16
Payer: COMMERCIAL

## 2024-08-26 ENCOUNTER — APPOINTMENT (OUTPATIENT)
Dept: ORTHOPEDIC SURGERY | Facility: CLINIC | Age: 54
End: 2024-08-26
Payer: COMMERCIAL

## 2024-08-26 DIAGNOSIS — G56.03 CARPAL TUNNEL SYNDROME, BILATERAL: Primary | ICD-10-CM

## 2024-08-26 PROCEDURE — 99024 POSTOP FOLLOW-UP VISIT: CPT | Performed by: ORTHOPAEDIC SURGERY

## 2024-08-26 PROCEDURE — 1036F TOBACCO NON-USER: CPT | Performed by: ORTHOPAEDIC SURGERY

## 2024-08-26 PROCEDURE — L3908 WHO COCK-UP NONMOLDE PRE OTS: HCPCS | Performed by: ORTHOPAEDIC SURGERY

## 2024-08-26 RX ORDER — CEFAZOLIN SODIUM 2 G/100ML
2 INJECTION, SOLUTION INTRAVENOUS ONCE
OUTPATIENT
Start: 2024-08-26 | End: 2024-08-26

## 2024-08-26 RX ORDER — SODIUM CHLORIDE, SODIUM LACTATE, POTASSIUM CHLORIDE, CALCIUM CHLORIDE 600; 310; 30; 20 MG/100ML; MG/100ML; MG/100ML; MG/100ML
20 INJECTION, SOLUTION INTRAVENOUS CONTINUOUS
OUTPATIENT
Start: 2024-08-26

## 2024-08-26 ASSESSMENT — PAIN - FUNCTIONAL ASSESSMENT: PAIN_FUNCTIONAL_ASSESSMENT: 0-10

## 2024-08-26 ASSESSMENT — PAIN SCALES - GENERAL: PAINLEVEL_OUTOF10: 0 - NO PAIN

## 2024-08-26 NOTE — PROGRESS NOTES
Reason for Appointment  Chief Complaint   Patient presents with    Left Wrist - Post-op     History of Present Illness  Patient is here today 2 weeks s/p a left carpal tunnel release. Patient is doing well overall.  Wound is healing nicely with no signs of infection, sutures were removed today.  She has had good early symptomatic improvement in terms of numbness and tingling in the hand.  She was educated starting scar massage in 4 days and we did get her a gel brace help soften her scar.  She is continue to have significant right hand symptoms as well and we will likely proceed with a right carpal tunnel release later this year.    Assessment   Encounter Diagnosis   Name Primary?    Carpal tunnel syndrome, bilateral Yes

## 2024-08-30 DIAGNOSIS — G56.03 CARPAL TUNNEL SYNDROME, BILATERAL: ICD-10-CM

## 2024-09-05 DIAGNOSIS — I10 HYPERTENSION, UNSPECIFIED TYPE: ICD-10-CM

## 2024-09-06 RX ORDER — CARVEDILOL 12.5 MG/1
12.5 TABLET ORAL 2 TIMES DAILY
Qty: 60 TABLET | Refills: 0 | Status: SHIPPED | OUTPATIENT
Start: 2024-09-06 | End: 2025-09-06

## 2024-09-18 ENCOUNTER — APPOINTMENT (OUTPATIENT)
Dept: PRIMARY CARE | Facility: CLINIC | Age: 54
End: 2024-09-18
Payer: COMMERCIAL

## 2024-09-18 ENCOUNTER — PHARMACY VISIT (OUTPATIENT)
Dept: PHARMACY | Facility: CLINIC | Age: 54
End: 2024-09-18
Payer: COMMERCIAL

## 2024-09-18 VITALS
OXYGEN SATURATION: 95 % | WEIGHT: 141 LBS | SYSTOLIC BLOOD PRESSURE: 149 MMHG | BODY MASS INDEX: 24.98 KG/M2 | DIASTOLIC BLOOD PRESSURE: 95 MMHG | RESPIRATION RATE: 18 BRPM | HEIGHT: 63 IN | HEART RATE: 65 BPM

## 2024-09-18 DIAGNOSIS — E03.9 HYPOTHYROIDISM, UNSPECIFIED TYPE: ICD-10-CM

## 2024-09-18 DIAGNOSIS — E55.9 VITAMIN D DEFICIENCY: ICD-10-CM

## 2024-09-18 DIAGNOSIS — F41.1 GAD (GENERALIZED ANXIETY DISORDER): ICD-10-CM

## 2024-09-18 DIAGNOSIS — E89.0 H/O THYROIDECTOMY: ICD-10-CM

## 2024-09-18 DIAGNOSIS — K21.9 GERD WITHOUT ESOPHAGITIS: ICD-10-CM

## 2024-09-18 DIAGNOSIS — E78.00 HYPERCHOLESTEROLEMIA: Primary | ICD-10-CM

## 2024-09-18 DIAGNOSIS — I10 HYPERTENSION, UNSPECIFIED TYPE: ICD-10-CM

## 2024-09-18 PROCEDURE — 99396 PREV VISIT EST AGE 40-64: CPT | Performed by: INTERNAL MEDICINE

## 2024-09-18 PROCEDURE — 3008F BODY MASS INDEX DOCD: CPT | Performed by: INTERNAL MEDICINE

## 2024-09-18 PROCEDURE — 3080F DIAST BP >= 90 MM HG: CPT | Performed by: INTERNAL MEDICINE

## 2024-09-18 PROCEDURE — RXMED WILLOW AMBULATORY MEDICATION CHARGE

## 2024-09-18 PROCEDURE — 3077F SYST BP >= 140 MM HG: CPT | Performed by: INTERNAL MEDICINE

## 2024-09-18 PROCEDURE — 1036F TOBACCO NON-USER: CPT | Performed by: INTERNAL MEDICINE

## 2024-09-18 RX ORDER — CARVEDILOL 12.5 MG/1
12.5 TABLET ORAL 2 TIMES DAILY
Qty: 180 TABLET | Refills: 0 | Status: SHIPPED | OUTPATIENT
Start: 2024-09-18 | End: 2025-09-18

## 2024-09-18 RX ORDER — CARVEDILOL 12.5 MG/1
12.5 TABLET ORAL 2 TIMES DAILY
Qty: 60 TABLET | Refills: 0 | Status: SHIPPED | OUTPATIENT
Start: 2024-09-18 | End: 2024-09-18 | Stop reason: SDUPTHER

## 2024-09-18 ASSESSMENT — PATIENT HEALTH QUESTIONNAIRE - PHQ9
1. LITTLE INTEREST OR PLEASURE IN DOING THINGS: NOT AT ALL
SUM OF ALL RESPONSES TO PHQ9 QUESTIONS 1 AND 2: 0
2. FEELING DOWN, DEPRESSED OR HOPELESS: NOT AT ALL

## 2024-09-18 ASSESSMENT — PAIN SCALES - GENERAL: PAINLEVEL: 0-NO PAIN

## 2024-09-18 NOTE — PROGRESS NOTES
Patient ID: She states she is doing ok. Denies any CP, cough or SOB. No issues with urination. She denies any constipation or diarrhea. She states she has gained a little bit of weight. She is followed by Dr. Cadet for her thyroid issues, will see him next  and then hopefully will no longer have to see him. She states that she had carpal tunnel surgery recently and her wrists are painful by the end of her work day, which is most likely why her BP is elevated today.    HPI: Jessica Francois is a 54 y.o. female with PMH remarkable for HTN, GERD, depression/anxiety, hypothyroidism (thyroid cancer s/p thyroidectomy ), vitamin d deficiency  who presents to the office today for Annual Exam.    HEALTH MAINTENANCE: Annual  Wellness Physical  Last Office Visit: 24  Mammogram (40-75): 23  Pap smear (21-65, or hysterectomy q5yrs):  Last Labs: last lipid panel 2022  Colonoscopy (45-75 or age 40 with 1st degree relative dx colon ca): 10/19/22 negative  Lung cancer screening (50-78 y/o x 20 pk yr for at least 20 yrs + current smoker OR quit in last 15 years, no CT w/I last year):   DEXA (65+, q 2 years): na    Social History     Tobacco Use    Smoking status: Former     Types: Cigarettes     Start date:      Quit date:      Years since quittin.7    Smokeless tobacco: Never    Tobacco comments:     LIGHT USE   Vaping Use    Vaping status: Never Used   Substance Use Topics    Alcohol use: Yes     Alcohol/week: 3.0 standard drinks of alcohol     Types: 3 Standard drinks or equivalent per week     Comment: occasional    Drug use: Never     Immunization History   Administered Date(s) Administered    Flu vaccine (IIV4), preservative free *Check age/dose* 10/25/2019    Influenza, seasonal, injectable 10/25/2021    Pfizer Purple Cap SARS-CoV-2 2021, 2022     Review of Systems   Constitutional: Negative.    HENT: Negative.     Respiratory: Negative.     Cardiovascular: Negative.   "  Gastrointestinal: Negative.    Genitourinary: Negative.    Musculoskeletal: Negative.    Skin: Negative.    Neurological: Negative.    Psychiatric/Behavioral: Negative.       No Known Allergies    Visit Vitals  BP (!) 149/95 (BP Location: Right arm, Patient Position: Sitting)   Pulse 65   Resp 18   Ht 1.6 m (5' 2.99\")   Wt 64 kg (141 lb)   SpO2 95%   BMI 24.98 kg/m²   OB Status Ablation   Smoking Status Former   BSA 1.69 m²     Physical Exam  Vitals reviewed.   Constitutional:       Appearance: Normal appearance.   HENT:      Head: Normocephalic and atraumatic.   Cardiovascular:      Rate and Rhythm: Normal rate and regular rhythm.      Pulses: Normal pulses.      Heart sounds: Normal heart sounds.   Pulmonary:      Effort: Pulmonary effort is normal.      Breath sounds: Normal breath sounds.   Abdominal:      General: Bowel sounds are normal.      Palpations: Abdomen is soft.   Musculoskeletal:         General: Normal range of motion.   Skin:     General: Skin is warm and dry.   Neurological:      General: No focal deficit present.      Mental Status: She is alert and oriented to person, place, and time.   Psychiatric:         Mood and Affect: Mood normal.         Behavior: Behavior normal.         Current Outpatient Medications   Medication Instructions    carvedilol (COREG) 12.5 mg, oral, 2 times daily, APPT NEEDED FOR FURTHER REFILLS    cholecalciferol (VITAMIN D3) 100 mcg, oral, Daily    citalopram (CELEXA) 10 mg, oral, Daily    levothyroxine (Synthroid, Levoxyl) 125 mcg tablet TAKE 1 TABLET BY MOUTH ONCE DAILY    omeprazole (PRILOSEC) 40 mg, oral, Daily before breakfast, Do not crush or chew.       Lab Results   Component Value Date    WBC 5.9 12/18/2023    HGB 13.3 12/18/2023    HCT 41.7 12/18/2023     12/18/2023    CHOL 289 (H) 11/01/2022    TRIG 166 (H) 11/01/2022    HDL 80.0 11/01/2022    ALT 14 12/18/2023    AST 17 12/18/2023     12/18/2023    K 3.8 12/18/2023     12/18/2023    " CREATININE 0.70 12/18/2023    BUN 15 12/18/2023    CO2 27 12/18/2023    TSH 1.39 06/05/2024    INR 0.9 04/15/2019    HGBA1C 4.8 10/10/2019     Problem List Items Addressed This Visit             ICD-10-CM    ASHLEY (generalized anxiety disorder) F41.1    GERD without esophagitis K21.9    Hypercholesterolemia - Primary E78.00    Relevant Orders    Lipid panel    CBC and Auto Differential    Comprehensive metabolic panel    Vitamin D deficiency E55.9    Hypothyroidism E03.9    HTN (hypertension) I10    Relevant Medications    carvedilol (Coreg) 12.5 mg tablet    H/O thyroidectomy E89.0     - she is followed by Dr. Cadet for history of thyroid cancer s/p thyroidectomy, most recent thyroid function reviewed from 6/5/24  - continue with current medications, no changes made today  - fasting bloodwork ordered  - follow up in four months  --------------------  Written by Lu Wick LPN, acting as a scribe for Dr. Chapa. This note accurately reflects the work and decisions made by Dr. Chapa.     I, Dr. Chapa, attest all medical record entries made by the scribe were under my direction and were personally dictated by me. I have reviewed the chart and agree that the record accurately reflects my performance of the history, physical exam, and assessment and plan.

## 2024-09-18 NOTE — PROGRESS NOTES
Subjective   Patient ID:   Jessica Francois is a 54 y.o. female who presents for Annual Exam.  HPI  Pain scale: 0 (no pain)  Living will? Yes  POA? Yes   Are you currently or have you recently been threatened or abused? No  Do you feel unsafe going back to the place you are living? No  Reported health: Good  Dental visits? Yes  Hearing problems? No  Vision problems? Yes - wears glasses  Healthy diet? Yes  Exercise? No exercise  Adequate fluid intake? Yes    Social History     Tobacco Use    Smoking status: Former     Types: Cigarettes     Start date:      Quit date:      Years since quittin.7    Smokeless tobacco: Never    Tobacco comments:     LIGHT USE   Vaping Use    Vaping status: Never Used   Substance Use Topics    Alcohol use: Yes     Alcohol/week: 3.0 standard drinks of alcohol     Types: 3 Standard drinks or equivalent per week     Comment: occasional    Drug use: Never       Immunization History   Administered Date(s) Administered    Flu vaccine (IIV4), preservative free *Check age/dose* 10/25/2019    Influenza, seasonal, injectable 10/25/2021    Pfizer Purple Cap SARS-CoV-2 2021, 2022       Review of Systems  12 point review of systems negative unless stated above in HPI    There were no vitals filed for this visit.    Physical Exam  General: Alert and oriented, well nourished, no acute distress.  Lungs: Clear to auscultation, non-labored respiration.  Heart: Normal rate, regular rhythm, no murmur, gallop or edema.  Neurologic: Awake, alert, and oriented X3, CN II-XII intact.  Psychiatric: Cooperative, appropriate mood and affect.    Assessment/Plan

## 2024-09-19 PROBLEM — E89.0 H/O THYROIDECTOMY: Status: ACTIVE | Noted: 2024-09-19

## 2024-09-19 PROBLEM — Z98.890 H/O THYROIDECTOMY: Status: ACTIVE | Noted: 2024-09-19

## 2024-09-19 PROBLEM — Z90.89 H/O THYROIDECTOMY: Status: ACTIVE | Noted: 2024-09-19

## 2024-09-19 PROBLEM — I10 HTN (HYPERTENSION): Status: ACTIVE | Noted: 2024-09-19

## 2024-09-19 ASSESSMENT — ENCOUNTER SYMPTOMS
GASTROINTESTINAL NEGATIVE: 1
NEUROLOGICAL NEGATIVE: 1
MUSCULOSKELETAL NEGATIVE: 1
CARDIOVASCULAR NEGATIVE: 1
CONSTITUTIONAL NEGATIVE: 1
RESPIRATORY NEGATIVE: 1
PSYCHIATRIC NEGATIVE: 1

## 2024-09-20 ENCOUNTER — APPOINTMENT (OUTPATIENT)
Dept: PRIMARY CARE | Facility: CLINIC | Age: 54
End: 2024-09-20
Payer: COMMERCIAL

## 2024-10-14 ENCOUNTER — PHARMACY VISIT (OUTPATIENT)
Dept: PHARMACY | Facility: CLINIC | Age: 54
End: 2024-10-14
Payer: COMMERCIAL

## 2024-10-14 PROCEDURE — RXMED WILLOW AMBULATORY MEDICATION CHARGE

## 2024-10-28 DIAGNOSIS — F41.1 GAD (GENERALIZED ANXIETY DISORDER): ICD-10-CM

## 2024-10-28 RX ORDER — CITALOPRAM 10 MG/1
10 TABLET ORAL DAILY
Qty: 90 TABLET | Refills: 0 | Status: SHIPPED | OUTPATIENT
Start: 2024-10-28

## 2024-11-04 ENCOUNTER — APPOINTMENT (OUTPATIENT)
Dept: DERMATOLOGY | Facility: CLINIC | Age: 54
End: 2024-11-04
Payer: COMMERCIAL

## 2024-11-13 ENCOUNTER — APPOINTMENT (OUTPATIENT)
Dept: PREADMISSION TESTING | Facility: HOSPITAL | Age: 54
End: 2024-11-13
Payer: COMMERCIAL

## 2024-11-26 ENCOUNTER — HOSPITAL ENCOUNTER (OUTPATIENT)
Dept: RADIOLOGY | Facility: HOSPITAL | Age: 54
Discharge: HOME | End: 2024-11-26
Payer: COMMERCIAL

## 2024-11-26 VITALS — WEIGHT: 130 LBS | BODY MASS INDEX: 23.04 KG/M2 | HEIGHT: 63 IN

## 2024-11-26 DIAGNOSIS — Z12.31 SCREENING MAMMOGRAM FOR BREAST CANCER: ICD-10-CM

## 2024-11-26 PROCEDURE — 77063 BREAST TOMOSYNTHESIS BI: CPT

## 2024-11-26 PROCEDURE — 77067 SCR MAMMO BI INCL CAD: CPT | Performed by: RADIOLOGY

## 2024-11-26 PROCEDURE — 77063 BREAST TOMOSYNTHESIS BI: CPT | Performed by: RADIOLOGY

## 2024-12-10 DIAGNOSIS — I10 HYPERTENSION, UNSPECIFIED TYPE: ICD-10-CM

## 2024-12-10 RX ORDER — CARVEDILOL 12.5 MG/1
12.5 TABLET ORAL 2 TIMES DAILY
Qty: 180 TABLET | Refills: 1 | Status: SHIPPED | OUTPATIENT
Start: 2024-12-10

## 2024-12-23 ENCOUNTER — PHARMACY VISIT (OUTPATIENT)
Dept: PHARMACY | Facility: CLINIC | Age: 54
End: 2024-12-23
Payer: COMMERCIAL

## 2024-12-23 PROCEDURE — RXMED WILLOW AMBULATORY MEDICATION CHARGE

## 2025-01-24 ENCOUNTER — PHARMACY VISIT (OUTPATIENT)
Dept: PHARMACY | Facility: CLINIC | Age: 55
End: 2025-01-24
Payer: COMMERCIAL

## 2025-01-24 PROCEDURE — RXMED WILLOW AMBULATORY MEDICATION CHARGE

## 2025-01-29 ENCOUNTER — APPOINTMENT (OUTPATIENT)
Dept: DERMATOLOGY | Facility: CLINIC | Age: 55
End: 2025-01-29
Payer: COMMERCIAL

## 2025-01-29 DIAGNOSIS — Z85.828 HISTORY OF NONMELANOMA SKIN CANCER: ICD-10-CM

## 2025-01-29 DIAGNOSIS — D22.9 BENIGN NEVUS: ICD-10-CM

## 2025-01-29 DIAGNOSIS — L82.1 SEBORRHEIC KERATOSIS: ICD-10-CM

## 2025-01-29 DIAGNOSIS — L57.9 SKIN CHANGES DUE TO CHRONIC EXPOSURE TO NONIONIZING RADIATION: ICD-10-CM

## 2025-01-29 DIAGNOSIS — D18.01 ANGIOMA OF SKIN: ICD-10-CM

## 2025-01-29 DIAGNOSIS — L57.0 ACTINIC KERATOSIS: Primary | ICD-10-CM

## 2025-01-29 DIAGNOSIS — L81.4 LENTIGO: ICD-10-CM

## 2025-01-29 DIAGNOSIS — L90.5 SCAR CONDITIONS AND FIBROSIS OF SKIN: ICD-10-CM

## 2025-01-29 PROCEDURE — 99213 OFFICE O/P EST LOW 20 MIN: CPT | Performed by: NURSE PRACTITIONER

## 2025-01-29 PROCEDURE — 17003 DESTRUCT PREMALG LES 2-14: CPT | Performed by: NURSE PRACTITIONER

## 2025-01-29 PROCEDURE — 17000 DESTRUCT PREMALG LESION: CPT | Performed by: NURSE PRACTITIONER

## 2025-01-29 PROCEDURE — 1036F TOBACCO NON-USER: CPT | Performed by: NURSE PRACTITIONER

## 2025-01-29 ASSESSMENT — DERMATOLOGY QUALITY OF LIFE (QOL) ASSESSMENT
RATE HOW BOTHERED YOU ARE BY EFFECTS OF YOUR SKIN PROBLEMS ON YOUR ACTIVITIES (EG, GOING OUT, ACCOMPLISHING WHAT YOU WANT, WORK ACTIVITIES OR YOUR RELATIONSHIPS WITH OTHERS): 0 - NEVER BOTHERED
RATE HOW BOTHERED YOU ARE BY EFFECTS OF YOUR SKIN PROBLEMS ON YOUR ACTIVITIES (EG, GOING OUT, ACCOMPLISHING WHAT YOU WANT, WORK ACTIVITIES OR YOUR RELATIONSHIPS WITH OTHERS): 0 - NEVER BOTHERED
RATE HOW BOTHERED YOU ARE BY SYMPTOMS OF YOUR SKIN PROBLEM (EG, ITCHING, STINGING BURNING, HURTING OR SKIN IRRITATION): 4
RATE HOW BOTHERED YOU ARE BY SYMPTOMS OF YOUR SKIN PROBLEM (EG, ITCHING, STINGING BURNING, HURTING OR SKIN IRRITATION): 4
WHAT SINGLE SKIN CONDITION LISTED BELOW IS THE PATIENT ANSWERING THE QUALITY-OF-LIFE ASSESSMENT QUESTIONS ABOUT: NONE OF THE ABOVE
RATE HOW EMOTIONALLY BOTHERED YOU ARE BY YOUR SKIN PROBLEM (FOR EXAMPLE, WORRY, EMBARRASSMENT, FRUSTRATION): 0 - NEVER BOTHERED
WHAT SINGLE SKIN CONDITION LISTED BELOW IS THE PATIENT ANSWERING THE QUALITY-OF-LIFE ASSESSMENT QUESTIONS ABOUT: NONE OF THE ABOVE
DATE THE QUALITY-OF-LIFE ASSESSMENT WAS COMPLETED: 67234
RATE HOW EMOTIONALLY BOTHERED YOU ARE BY YOUR SKIN PROBLEM (FOR EXAMPLE, WORRY, EMBARRASSMENT, FRUSTRATION): 0 - NEVER BOTHERED

## 2025-01-29 NOTE — PATIENT INSTRUCTIONS

## 2025-01-29 NOTE — PROGRESS NOTES
Subjective     Jessica Francois is a 54 y.o. female who presents for the following: Skin Check.     Review of Systems:  No other skin or systemic complaints other than what is documented elsewhere in the note.    The following portions of the chart were reviewed this encounter and updated as appropriate:   Tobacco  Allergies  Meds  Problems  Med Hx  Surg Hx         Skin Cancer History  No skin cancer on file.      Specialty Problems          Dermatology Problems    Facial basal cell cancer        Objective   Well appearing patient in no apparent distress; mood and affect are within normal limits.    A full examination was performed including scalp, head, eyes, ears, nose, lips, neck, chest, axillae, abdomen, back, buttocks, bilateral upper extremities, bilateral lower extremities, hands, feet, fingers, toes, fingernails, and toenails. All findings within normal limits unless otherwise noted below.      Assessment/Plan   1. Actinic keratosis (6)  Chest - Medial (Center), Left Breast (2), Left Tip of Nose, Right Breast (2)  Thin erythematous papules with gritty scale    WHAT IS ACTINIC KERATOSIS?   - Actinic keratosis (AK) is a skin condition caused by sun damage. It causes scaly, rough, or bumpy spots on the skin.  - If left alone, AKs may turn into a skin cancer. People who burn easily or have trouble tanning are at more risk for developing AKs.   - There is no one test for AKs and diagnosis is made by clinical appearance. Treatment options include cryotherapy, therapy with lights, and various creams (e.g., topical 5-fluorocuracil, imiquimod).       To lower the chance of getting AK, you can:       ?  Stay out of the sun in the middle of the day (from 10 a.m. to 4 p.m.)       ?  Wear sunscreen - An SPF of at least 30 is best. The SPF number is on the sunscreen bottle or tube.       ?  Wear a wide-brimmed hat, long-sleeved shirt, long pants, or long skirt outside. A baseball hat does not give much protection.         ?  Do not use tanning beds.        ?  Keep a low-fat diet, less than 21% of calories should come from fat       ?  Take Vitamin B3 (nicotinomide) 500mg twice daily.      YOUR TREATMENT PLAN  - At this time I recommend treatment with cryotherapy.  - Possible side effects of liquid nitrogen treatment reviewed including formation of blisters, crusting, tenderness, scar, and discoloration which may be permanent.  - Patient advised to return the office for re-evaluation if the treated lesion(s) do not resolve within 4-6 weeks. Patient verbalizes understanding.    Destr of lesion - Chest - Medial (Center), Left Breast (2), Left Tip of Nose, Right Breast (2)  Complexity: simple    Destruction method: cryotherapy    Informed consent: discussed and consent obtained    Timeout:  patient name, date of birth, surgical site, and procedure verified  Lesion destroyed using liquid nitrogen: Yes    Cryotherapy cycles:  2  Outcome: patient tolerated procedure well with no complications    Post-procedure details: wound care instructions given      Related Procedures  Follow Up In Dermatology - Established Patient    2. Benign nevus  Scattered, uniform and benign-appearing, regular brown melanocytic papules and macules.    Right upper lip has a 3 x 4.5 mm uniform tan brown macule with reticular fingerprint pattern and some brown dots. No other features noted. No vascular patterns noted.     The present appearance of the lesion is not worrisome but it should continue to be observed and testing/treatment may be warranted if change occurs.    No suspicious features noted. Advised to monitor and return to clinic if change is noted.     Related Procedures  Follow Up In Dermatology - Established Patient  Follow Up In Dermatology - Established Patient    3. Scar conditions and fibrosis of skin  Head - Anterior (Face)  Well healed scar at the site(s) of prior treatment with no evidence of recurrence.          The scar is clear, there is no  evidence of recurrence.  The present appearance of the scar is not worrisome but it should continue to be observed and testing/treatment may be warranted if change occurs.      Related Procedures  Follow Up In Dermatology - Established Patient  Follow Up In Dermatology - Established Patient    4. Angioma of skin  Scattered cherry-red papule(s).    A cherry hemangioma is a small macule (small, flat, smooth area) or papule (small, solid bump) formed from an overgrowth of tiny blood vessels in the skin. Cherry hemangiomas are characteristically red or purplish in color. They often first appear in middle adulthood and usually increase in number with age. Cherry hemangiomas are noncancerous (benign) and are common in adults.    The present appearance of the lesion is not worrisome but it should continue to be observed and testing/treatment may be warranted if change occurs.    Related Procedures  Follow Up In Dermatology - Established Patient  Follow Up In Dermatology - Established Patient    5. Seborrheic keratosis  Stuck on verrucous, tan-brown papules and plaques.      Seborrheic keratoses are common noncancerous (benign) growths of unknown cause seen in adults due to a thickening of an area of the top skin layer. Seborrheic keratoses may appear as if they are stuck on to the skin. They have distinct borders, and they may appear as papules (small, solid bumps) or plaques (solid, raised patches that are bigger than a thumbnail). They may be the same color as your skin, or they may be pink, light brown, darker brown, or very dark brown, or sometimes may appear black.    There is no way to prevent new seborrheic keratoses from forming. Seborrheic keratoses can be removed, but removal is considered a cosmetic issue and is usually not covered by insurance.    PLAN  No treatment is needed unless there is irritation from clothing, such as itching or bleeding.  2.   Some lotions containing alpha hydroxy acids, salicylic acid,  or urea may make the areas feel smoother with regular use but will not eliminate them.    Related Procedures  Follow Up In Dermatology - Established Patient  Follow Up In Dermatology - Established Patient    6. Lentigo  Scattered tan macules in sun-exposed areas.    A solar lentigo (plural, solar lentigines), also known as a sun-induced freckle or senile lentigo, is a dark (hyperpigmented) lesion caused by natural or artificial ultraviolet (UV) light. Solar lentigines may be single or multiple. This type of lentigo is different from a simple lentigo (lentigo simplex) because it is caused by exposure to UV light. Solar lentigines are benign, but they do indicate excessive sun exposure, a risk factor for the development of skin cancer.    To prevent solar lentigines, avoid exposure to sunlight in midday (10 AM to 3 PM), wear sun-protective clothing (tightly woven clothes and hats), and apply sunscreen (SPF 30 UVA and UVB block).    The present appearance of the lesion is not worrisome but it should continue to be observed and testing/treatment may be warranted if change occurs.    Related Procedures  Follow Up In Dermatology - Established Patient  Follow Up In Dermatology - Established Patient    7. History of nonmelanoma skin cancer    ABCDEs of melanoma and atypical moles were discussed with the patient.    Patient was instructed to perform monthly self skin examination.  We recommended that the patient have regular full skin exams given an increased risk of subsequent skin cancers.    The patient was instructed to use sun protective behaviors including use of broad spectrum sunscreens and sun protective clothing to reduce risk of skin cancers.    Warning signs of non-melanoma skin cancer discussed.    Related Procedures  Follow Up In Dermatology - Established Patient  Follow Up In Dermatology - Established Patient    8. Skin changes due to chronic exposure to nonionizing radiation  Actinic changes in the form of  freckles, lentigines and hyper/hypopigmentation     ABCDEs of melanoma and atypical moles were discussed with the patient.    Patient was instructed to perform monthly self skin examination.  We recommended that the patient have regular full skin exams given an increased risk of subsequent skin cancers.    The patient was instructed to use sun protective behaviors including use of broad spectrum sunscreens and sun protective clothing to reduce risk of skin cancers.    Warning signs of non-melanoma skin cancer discussed.    Related Procedures  Follow Up In Dermatology - Established Patient  Follow Up In Dermatology - Established Patient        Return to clinic in 1 year for skin check/follow up or sooner if needed

## 2025-02-06 ENCOUNTER — PHARMACY VISIT (OUTPATIENT)
Dept: PHARMACY | Facility: CLINIC | Age: 55
End: 2025-02-06
Payer: COMMERCIAL

## 2025-02-06 PROCEDURE — RXMED WILLOW AMBULATORY MEDICATION CHARGE

## 2025-02-24 ASSESSMENT — ENCOUNTER SYMPTOMS
GASTROINTESTINAL NEGATIVE: 1
RESPIRATORY NEGATIVE: 1
BACK PAIN: 1
ENDOCRINE NEGATIVE: 1
PSYCHIATRIC NEGATIVE: 1
NEUROLOGICAL NEGATIVE: 1
CONSTITUTIONAL NEGATIVE: 1
EYES NEGATIVE: 1
CARDIOVASCULAR NEGATIVE: 1

## 2025-02-24 NOTE — PROGRESS NOTES
Patient ID:   Jessica Francois is a 54 y.o. female with PMH remarkable for HTN, GERD, depression/anxiety, hypothyroidism (thyroid cancer s/p thyroidectomy 2019), vitamin d deficiency who presents to the office today for No chief complaint on file..    Last Office Visit: 2024 for annual exam. Lab work was ordered. Follows with Dr Cadet for thyroid. Recently had carpal tunnel surgery. Labs have not be obtained yet.    Had a normal Xray lumbar spine in 2017.    Back Pain  This is a new problem.     Social History     Tobacco Use    Smoking status: Former     Types: Cigarettes     Start date:      Quit date:      Years since quittin.1    Smokeless tobacco: Never    Tobacco comments:     LIGHT USE   Substance Use Topics    Alcohol use: Yes     Alcohol/week: 4.0 standard drinks of alcohol     Types: 4 Glasses of wine per week     Comment: occasional      Review of Systems   Constitutional: Negative.    HENT: Negative.     Eyes: Negative.    Respiratory: Negative.     Cardiovascular: Negative.    Gastrointestinal: Negative.    Endocrine: Negative.    Genitourinary: Negative.    Musculoskeletal:  Positive for back pain.   Skin: Negative.    Neurological: Negative.    Psychiatric/Behavioral: Negative.     All other systems reviewed and are negative.    Visit Vitals  OB Status Ablation   Smoking Status Former     No Known Allergies   Physical Exam  Vitals reviewed. Exam conducted with a chaperone present.   Constitutional:       Appearance: Normal appearance. She is well-developed.   HENT:      Head: Normocephalic.      Right Ear: External ear normal.      Left Ear: External ear normal.      Nose: Nose normal.      Mouth/Throat:      Lips: Pink.      Mouth: Mucous membranes are moist.   Eyes:      General: Lids are normal.      Pupils: Pupils are equal, round, and reactive to light.   Neck:      Trachea: Trachea normal.   Cardiovascular:      Rate and Rhythm: Normal rate and regular rhythm.      Heart sounds:  Normal heart sounds.   Pulmonary:      Effort: Pulmonary effort is normal.      Breath sounds: Normal breath sounds.   Abdominal:      General: Bowel sounds are normal.      Palpations: Abdomen is soft.   Musculoskeletal:      Cervical back: Full passive range of motion without pain.   Skin:     General: Skin is warm and moist.   Neurological:      General: No focal deficit present.      Mental Status: She is alert and oriented to person, place, and time. Mental status is at baseline.   Psychiatric:         Attention and Perception: Attention normal.         Mood and Affect: Mood normal.         Speech: Speech normal.         Behavior: Behavior is cooperative.         Thought Content: Thought content normal.         Cognition and Memory: Cognition normal.         Judgment: Judgment normal.       Current Outpatient Medications   Medication Instructions    carvedilol (COREG) 12.5 mg, oral, 2 times daily    cholecalciferol (VITAMIN D3) 100 mcg, oral, Daily    citalopram (CELEXA) 10 mg, oral, Daily    levothyroxine (Synthroid, Levoxyl) 125 mcg tablet TAKE 1 TABLET BY MOUTH ONCE DAILY    omeprazole (PRILOSEC) 40 mg, oral, Daily before breakfast, Do not crush or chew.      Lab Results   Component Value Date    WBC 5.9 12/18/2023    HGB 13.3 12/18/2023    HCT 41.7 12/18/2023     12/18/2023    CHOL 289 (H) 11/01/2022    TRIG 166 (H) 11/01/2022    HDL 80.0 11/01/2022    ALT 14 12/18/2023    AST 17 12/18/2023     12/18/2023    K 3.8 12/18/2023     12/18/2023    CREATININE 0.70 12/18/2023    BUN 15 12/18/2023    CO2 27 12/18/2023    TSH 1.39 06/05/2024    INR 0.9 04/15/2019    HGBA1C 4.8 10/10/2019       Assessment/Plan   {Assess/PlanSmartLinks:38181}  ------  Written by @CD@, acting as a scribe for Dr. Chapa. This note accurately reflects the work and decisions made by Dr. Chapa.     I, Dr. Chapa, attest all medical record entries made by the scribe were under my direction and were personally dictated by me. I  have reviewed the chart and agree that the record accurately reflects my performance of the history, physical exam, and assessment and plan.

## 2025-02-25 ENCOUNTER — APPOINTMENT (OUTPATIENT)
Dept: PRIMARY CARE | Facility: CLINIC | Age: 55
End: 2025-02-25
Payer: COMMERCIAL

## 2025-03-19 ENCOUNTER — APPOINTMENT (OUTPATIENT)
Dept: PRIMARY CARE | Facility: CLINIC | Age: 55
End: 2025-03-19
Payer: COMMERCIAL

## 2025-04-07 ENCOUNTER — PHARMACY VISIT (OUTPATIENT)
Dept: PHARMACY | Facility: CLINIC | Age: 55
End: 2025-04-07
Payer: COMMERCIAL

## 2025-04-07 PROCEDURE — RXMED WILLOW AMBULATORY MEDICATION CHARGE

## 2025-04-17 DIAGNOSIS — K21.9 GERD WITHOUT ESOPHAGITIS: ICD-10-CM

## 2025-04-17 PROCEDURE — RXMED WILLOW AMBULATORY MEDICATION CHARGE

## 2025-04-18 ENCOUNTER — PHARMACY VISIT (OUTPATIENT)
Dept: PHARMACY | Facility: CLINIC | Age: 55
End: 2025-04-18
Payer: COMMERCIAL

## 2025-04-18 PROCEDURE — RXMED WILLOW AMBULATORY MEDICATION CHARGE

## 2025-04-18 RX ORDER — OMEPRAZOLE 40 MG/1
40 CAPSULE, DELAYED RELEASE ORAL
Qty: 90 CAPSULE | Refills: 0 | Status: SHIPPED | OUTPATIENT
Start: 2025-04-18

## 2025-04-30 DIAGNOSIS — F41.1 GAD (GENERALIZED ANXIETY DISORDER): ICD-10-CM

## 2025-04-30 RX ORDER — CITALOPRAM 10 MG/1
10 TABLET ORAL DAILY
Qty: 90 TABLET | Refills: 0 | Status: SHIPPED | OUTPATIENT
Start: 2025-04-30

## 2025-06-10 ENCOUNTER — APPOINTMENT (OUTPATIENT)
Dept: PRIMARY CARE | Facility: CLINIC | Age: 55
End: 2025-06-10
Payer: COMMERCIAL

## 2025-06-12 DIAGNOSIS — E55.9 VITAMIN D DEFICIENCY: ICD-10-CM

## 2025-06-12 DIAGNOSIS — C73 MALIGNANT NEOPLASM OF THYROID GLAND (MULTI): Primary | ICD-10-CM

## 2025-06-15 LAB
25(OH)D3+25(OH)D2 SERPL-MCNC: 32 NG/ML (ref 30–100)
ANION GAP SERPL CALCULATED.4IONS-SCNC: 11 MMOL/L (CALC) (ref 7–17)
BUN SERPL-MCNC: 14 MG/DL (ref 7–25)
BUN/CREAT SERPL: NORMAL (CALC) (ref 6–22)
CALCIUM SERPL-MCNC: 9.3 MG/DL (ref 8.6–10.4)
CHLORIDE SERPL-SCNC: 103 MMOL/L (ref 98–110)
CO2 SERPL-SCNC: 25 MMOL/L (ref 20–32)
CREAT SERPL-MCNC: 0.54 MG/DL (ref 0.5–1.03)
EGFRCR SERPLBLD CKD-EPI 2021: 109 ML/MIN/1.73M2
GLUCOSE SERPL-MCNC: 92 MG/DL (ref 65–139)
POTASSIUM SERPL-SCNC: 4.2 MMOL/L (ref 3.5–5.3)
SODIUM SERPL-SCNC: 139 MMOL/L (ref 135–146)
THYROGLOB AB SERPL-ACNC: NORMAL [IU]/ML
TSH SERPL-ACNC: 0.16 MIU/L

## 2025-06-16 ENCOUNTER — APPOINTMENT (OUTPATIENT)
Dept: ENDOCRINOLOGY | Facility: CLINIC | Age: 55
End: 2025-06-16
Payer: COMMERCIAL

## 2025-06-16 VITALS — WEIGHT: 130 LBS | BODY MASS INDEX: 23.03 KG/M2

## 2025-06-16 DIAGNOSIS — C73 MALIGNANT NEOPLASM OF THYROID GLAND (MULTI): Primary | ICD-10-CM

## 2025-06-16 DIAGNOSIS — E55.9 VITAMIN D DEFICIENCY: ICD-10-CM

## 2025-06-16 PROCEDURE — RXMED WILLOW AMBULATORY MEDICATION CHARGE

## 2025-06-16 PROCEDURE — 99214 OFFICE O/P EST MOD 30 MIN: CPT | Performed by: INTERNAL MEDICINE

## 2025-06-16 PROCEDURE — 76536 US EXAM OF HEAD AND NECK: CPT | Performed by: INTERNAL MEDICINE

## 2025-06-16 RX ORDER — LEVOTHYROXINE SODIUM 112 UG/1
112 TABLET ORAL DAILY
Qty: 90 TABLET | Refills: 3 | Status: SHIPPED | OUTPATIENT
Start: 2025-06-16 | End: 2026-06-16

## 2025-06-16 NOTE — PATIENT INSTRUCTIONS
RECOMMENDATIONS  Thyroglobulin Results available on [employer]  Call/message if you have not received results in 7 days.     Decrease levothyroxine to 112 mcg/day  Take levothyroxine on an empty stomach with water alone, 1 hour before eating or taking other medications, 4 hours before any calcium or iron supplement.    Follow up 5-6 months  Repeat TSH before next appointment    Next routine ultrasound in 2027    Continue vitamin D 1000 units/day

## 2025-06-16 NOTE — LETTER
2025     No Recipients    Patient: Jessica Francois   YOB: 1970   Date of Visit: 2025       Dear Dr. Lara Recipients:    Thank you for referring Jessica Francois to me for evaluation. Below are my notes for this consultation.  If you have questions, please do not hesitate to call me. I look forward to following your patient along with you.       Sincerely,     Kenyon Cadet MD      CC: No Recipients  ______________________________________________________________________________________    History Of Present Illness  Jessica Francois is a 54 y.o. female with a history of thyroid cancer.     Levothyroxine 125 mcg/day  Patient is taking levothyroxine on an empty stomach with water alone.     Thyroid cancer diagnosis date: 19   Type: Papillary  Size of primary tumor: 0.3 cm, isthmus  Extrathyroidal extension (ETE): Negative  Lymph Nodes: 0/1   Distant Metastases: None known   Pathologic Staging: pT1a N0 Mx.      Surgery: Thyroidectomy (19)    Vitamin D supplementation 1000 units/day      Past Medical History  She has a past medical history of Actinic keratosis, Allergic contact dermatitis due to plants, except food (08/15/2017), Anxiety disorder, unspecified (2013), Basal cell carcinoma, Cancer (Multi), Cellulitis, unspecified (2016), Complete or unspecified spontaneous  without complication, Encounter for screening mammogram for malignant neoplasm of breast (2017), Hypertension, Hypothyroidism, Influenza due to other identified influenza virus with other respiratory manifestations (2017), Mild cervical dysplasia, Other mucopurulent conjunctivitis, unspecified eye (10/08/2014), Other specified postprocedural states, Papillomavirus as the cause of diseases classified elsewhere, Personal history of other complications of pregnancy, childbirth and the puerperium, Personal history of other diseases of the nervous system and sense organs (2014), Personal  history of other endocrine, nutritional and metabolic disease (10/07/2019), Personal history of other infectious and parasitic diseases, Personal history of other malignant neoplasm of skin, Personal history of other specified conditions (2014), Personal history of other specified conditions (2013), and Varicella.    She has no past medical history of Asthma, Awareness under anesthesia, COPD (chronic obstructive pulmonary disease) (Multi), Delayed emergence from general anesthesia, Diabetes mellitus type I (Multi), Hard to intubate, Irregular heart beat, Malignant hyperthermia, PONV (postoperative nausea and vomiting), Refusal of blood product, Sleep apnea, or Type 2 diabetes mellitus.    Surgical History  She has a past surgical history that includes Cholecystectomy (2013); Tonsillectomy (2017); Hysteroscopy (2017); Cervical biopsy w/ loop electrode excision (2017); Laparoscopy diagnostic / biopsy / aspiration / lysis (2017); Colonoscopy (2017); Other surgical history (2017);  section, classic (2017); CT angio neck (2017); CT angio head w and wo IV contrast (2017); Breast biopsy (Left); Thyroidectomy; Skin biopsy; and Mohs surgery.     Social History  She reports that she quit smoking about 35 years ago. Her smoking use included cigarettes. She started smoking about 33 years ago. She has never used smokeless tobacco. She reports current alcohol use of about 4.0 standard drinks of alcohol per week. She reports that she does not use drugs.    Family History  Family History[1]    Medications  Current Outpatient Medications   Medication Instructions    carvedilol (COREG) 12.5 mg, oral, 2 times daily    cholecalciferol (VITAMIN D3) 100 mcg, Daily    citalopram (CELEXA) 10 mg, oral, Daily    levothyroxine (Synthroid, Levoxyl) 125 mcg tablet TAKE 1 TABLET BY MOUTH ONCE DAILY    omeprazole (PRILOSEC) 40 mg, oral, Daily before breakfast, Do not  crush or chew.       Allergies  Patient has no known allergies.    Review of Systems      Last Recorded Vitals  Weight 59 kg (130 lb).    Physical Exam     Relevant Results   Latest Reference Range & Units 06/13/25 12:08   GLUCOSE 65 - 139 mg/dL 92   SODIUM 135 - 146 mmol/L 139   POTASSIUM 3.5 - 5.3 mmol/L 4.2   CHLORIDE 98 - 110 mmol/L 103   CARBON DIOXIDE 20 - 32 mmol/L 25   ELECTROLYTE BALANCE 7 - 17 mmol/L (calc) 11   UREA NITROGEN (BUN) 7 - 25 mg/dL 14   CREATININE 0.50 - 1.03 mg/dL 0.54   EGFR > OR = 60 mL/min/1.73m2 109   BUN/CREATININE RATIO 6 - 22 (calc) SEE NOTE:   CALCIUM 8.6 - 10.4 mg/dL 9.3   TSH mIU/L 0.16 (L)   VITAMIN D,25-OH,TOTAL,IA 30 - 100 ng/mL 32   THYROGLOBULIN ANTIBODIES  COMMENT ONLY (P)           IMPRESSION  ***    VITAMIN D DEFICIENCY      RECOMMENDATIONS  Thyroglobulin Results available on [employer]  Call/message if you have not received results in 7 days.     Decrease levothyroxine to 112 mcg/day  Take levothyroxine on an empty stomach with water alone, 1 hour before eating or taking other medications, 4 hours before any calcium or iron supplement.    Follow up 5-6 months  Repeat TSH before next appointment    Next routine ultrasound in 2027    Continue vitamin D 1000 units/day           [1]  Family History  Problem Relation Name Age of Onset    Diabetes Mother      Thyroid disease Mother      Heart disease Father Dad     Diabetes Father Dad     Bone cancer Father Dad     Arthritis Father Dad     Hyperthyroidism Brother Kristian     Squamous cell carcinoma Brother Kristian     Thyroid cancer Cousin      Cancer Maternal Grandfather Grandfather

## 2025-06-16 NOTE — PROGRESS NOTES
History Of Present Illness  Jessica Francois is a 54 y.o. female with a history of thyroid cancer.     Levothyroxine 125 mcg/day  Patient is taking levothyroxine on an empty stomach with water alone.     Thyroid cancer diagnosis date: 19   Type: Papillary  Size of primary tumor: 0.3 cm, isthmus  Extrathyroidal extension (ETE): Negative  Lymph Nodes: 0/1   Distant Metastases: None known   Pathologic Staging: pT1a N0 Mx.      Surgery: Thyroidectomy (19)    Vitamin D supplementation 1000 units/day      Past Medical History  She has a past medical history of Actinic keratosis, Allergic contact dermatitis due to plants, except food (08/15/2017), Anxiety disorder, unspecified (2013), Basal cell carcinoma, Cancer (Multi), Cellulitis, unspecified (2016), Complete or unspecified spontaneous  without complication, Encounter for screening mammogram for malignant neoplasm of breast (2017), Hypertension, Hypothyroidism, Influenza due to other identified influenza virus with other respiratory manifestations (2017), Mild cervical dysplasia, Other mucopurulent conjunctivitis, unspecified eye (10/08/2014), Other specified postprocedural states, Papillomavirus as the cause of diseases classified elsewhere, Personal history of other complications of pregnancy, childbirth and the puerperium, Personal history of other diseases of the nervous system and sense organs (2014), Personal history of other endocrine, nutritional and metabolic disease (10/07/2019), Personal history of other infectious and parasitic diseases, Personal history of other malignant neoplasm of skin, Personal history of other specified conditions (2014), Personal history of other specified conditions (2013), and Varicella.    She has no past medical history of Asthma, Awareness under anesthesia, COPD (chronic obstructive pulmonary disease) (Multi), Delayed emergence from general anesthesia, Diabetes mellitus  type I (Multi), Hard to intubate, Irregular heart beat, Malignant hyperthermia, PONV (postoperative nausea and vomiting), Refusal of blood product, Sleep apnea, or Type 2 diabetes mellitus.    Surgical History  She has a past surgical history that includes Cholecystectomy (2013); Tonsillectomy (2017); Hysteroscopy (2017); Cervical biopsy w/ loop electrode excision (2017); Laparoscopy diagnostic / biopsy / aspiration / lysis (2017); Colonoscopy (2017); Other surgical history (2017);  section, classic (2017); CT angio neck (2017); CT angio head w and wo IV contrast (2017); Breast biopsy (Left); Thyroidectomy; Skin biopsy; and Mohs surgery.     Social History  She reports that she quit smoking about 35 years ago. Her smoking use included cigarettes. She started smoking about 33 years ago. She has never used smokeless tobacco. She reports current alcohol use of about 4.0 standard drinks of alcohol per week. She reports that she does not use drugs.    Family History  Family History[1]    Medications  Current Outpatient Medications   Medication Instructions    carvedilol (COREG) 12.5 mg, oral, 2 times daily    cholecalciferol (VITAMIN D3) 100 mcg, Daily    citalopram (CELEXA) 10 mg, oral, Daily    levothyroxine (Synthroid, Levoxyl) 125 mcg tablet TAKE 1 TABLET BY MOUTH ONCE DAILY    omeprazole (PRILOSEC) 40 mg, oral, Daily before breakfast, Do not crush or chew.       Allergies  Patient has no known allergies.    Review of Systems   Constitutional:  Negative for fatigue, fever and unexpected weight change.   HENT:  Negative for trouble swallowing.    Eyes:  Negative for visual disturbance.   Respiratory:  Negative for shortness of breath.    Cardiovascular:  Negative for chest pain and palpitations.   Gastrointestinal:  Negative for abdominal pain, constipation, diarrhea, nausea and vomiting.   Endocrine: Negative for cold intolerance and heat  intolerance.   Musculoskeletal:  Negative for neck pain.   Skin:  Negative for rash.   Neurological:  Negative for tremors, weakness and headaches.   Psychiatric/Behavioral:  The patient is not nervous/anxious.          Last Recorded Vitals  Weight 59 kg (130 lb).    Physical Exam  Constitutional:       General: She is not in acute distress.  HENT:      Head: Normocephalic.      Mouth/Throat:      Mouth: Mucous membranes are moist.   Eyes:      Extraocular Movements: Extraocular movements intact.   Neck:      Comments: Thyroidectomy scar, no palpable gland  Cardiovascular:      Pulses:           Radial pulses are 2+ on the right side and 2+ on the left side.   Musculoskeletal:      Right lower leg: No edema.      Left lower leg: No edema.   Lymphadenopathy:      Cervical: No cervical adenopathy.   Neurological:      Mental Status: She is alert.      Motor: No tremor.   Psychiatric:         Mood and Affect: Affect normal.       I personally performed real-time ultrasound examination of the anterior neck.  See procedure report in Media.       Relevant Results   Latest Reference Range & Units 06/13/25 12:08   GLUCOSE 65 - 139 mg/dL 92   SODIUM 135 - 146 mmol/L 139   POTASSIUM 3.5 - 5.3 mmol/L 4.2   CHLORIDE 98 - 110 mmol/L 103   CARBON DIOXIDE 20 - 32 mmol/L 25   ELECTROLYTE BALANCE 7 - 17 mmol/L (calc) 11   UREA NITROGEN (BUN) 7 - 25 mg/dL 14   CREATININE 0.50 - 1.03 mg/dL 0.54   EGFR > OR = 60 mL/min/1.73m2 109   BUN/CREATININE RATIO 6 - 22 (calc) SEE NOTE:   CALCIUM 8.6 - 10.4 mg/dL 9.3   TSH mIU/L 0.16 (L)   VITAMIN D,25-OH,TOTAL,IA 30 - 100 ng/mL 32   THYROGLOBULIN ANTIBODIES  COMMENT ONLY (P)           IMPRESSION  PAPILLARY THYROID CARCINOMA  No ultrasound evidence of persistent disease at 5 years  Thyroglobulin pending  TSH suppressed    VITAMIN D DEFICIENCY  Vitamin D concentration acceptable on current supplementation      RECOMMENDATIONS  Thyroglobulin Results available on [facility]  Call/message if you have  not received results in 7 days.     Decrease levothyroxine to 112 mcg/day  Take levothyroxine on an empty stomach with water alone, 1 hour before eating or taking other medications, 4 hours before any calcium or iron supplement.    Follow up 5-6 months  Repeat TSH before next appointment    Next routine ultrasound in 2027    Continue vitamin D 1000 units/day         [1]   Family History  Problem Relation Name Age of Onset    Diabetes Mother      Thyroid disease Mother      Heart disease Father Dad     Diabetes Father Dad     Bone cancer Father Dad     Arthritis Father Dad     Hyperthyroidism Brother Kristian     Squamous cell carcinoma Brother Kristain     Thyroid cancer Cousin      Cancer Maternal Grandfather Grandfather

## 2025-06-16 NOTE — LETTER
2025     Bessie Pritchard MD  [address] [address]    Patient: eJssica Francois   YOB: 1970   Date of Visit: 2025       Dear Dr. Bessie Pritchard MD:    Thank you for referring Jessica Francois to me for evaluation. Below are my notes for this consultation.  If you have questions, please do not hesitate to call me. I look forward to following your patient along with you.       Sincerely,     Kenyon Cadet MD      CC: No Recipients  ______________________________________________________________________________________    History Of Present Illness  Jessica Francois is a 54 y.o. female with a history of thyroid cancer.     Levothyroxine 125 mcg/day  Patient is taking levothyroxine on an empty stomach with water alone.     Thyroid cancer diagnosis date: 19   Type: Papillary  Size of primary tumor: 0.3 cm, isthmus  Extrathyroidal extension (ETE): Negative  Lymph Nodes: 0/1   Distant Metastases: None known   Pathologic Staging: pT1a N0 Mx.      Surgery: Thyroidectomy (19)    Vitamin D supplementation 1000 units/day      Past Medical History  She has a past medical history of Actinic keratosis, Allergic contact dermatitis due to plants, except food (08/15/2017), Anxiety disorder, unspecified (2013), Basal cell carcinoma, Cancer (Multi), Cellulitis, unspecified (2016), Complete or unspecified spontaneous  without complication, Encounter for screening mammogram for malignant neoplasm of breast (2017), Hypertension, Hypothyroidism, Influenza due to other identified influenza virus with other respiratory manifestations (2017), Mild cervical dysplasia, Other mucopurulent conjunctivitis, unspecified eye (10/08/2014), Other specified postprocedural states, Papillomavirus as the cause of diseases classified elsewhere, Personal history of other complications of pregnancy, childbirth and the puerperium, Personal history of other diseases of the  nervous system and sense organs (2014), Personal history of other endocrine, nutritional and metabolic disease (10/07/2019), Personal history of other infectious and parasitic diseases, Personal history of other malignant neoplasm of skin, Personal history of other specified conditions (2014), Personal history of other specified conditions (2013), and Varicella.    She has no past medical history of Asthma, Awareness under anesthesia, COPD (chronic obstructive pulmonary disease) (Multi), Delayed emergence from general anesthesia, Diabetes mellitus type I (Multi), Hard to intubate, Irregular heart beat, Malignant hyperthermia, PONV (postoperative nausea and vomiting), Refusal of blood product, Sleep apnea, or Type 2 diabetes mellitus.    Surgical History  She has a past surgical history that includes Cholecystectomy (2013); Tonsillectomy (2017); Hysteroscopy (2017); Cervical biopsy w/ loop electrode excision (2017); Laparoscopy diagnostic / biopsy / aspiration / lysis (2017); Colonoscopy (2017); Other surgical history (2017);  section, classic (2017); CT angio neck (2017); CT angio head w and wo IV contrast (2017); Breast biopsy (Left); Thyroidectomy; Skin biopsy; and Mohs surgery.     Social History  She reports that she quit smoking about 35 years ago. Her smoking use included cigarettes. She started smoking about 33 years ago. She has never used smokeless tobacco. She reports current alcohol use of about 4.0 standard drinks of alcohol per week. She reports that she does not use drugs.    Family History  Family History[1]    Medications  Current Outpatient Medications   Medication Instructions   • carvedilol (COREG) 12.5 mg, oral, 2 times daily   • cholecalciferol (VITAMIN D3) 100 mcg, Daily   • citalopram (CELEXA) 10 mg, oral, Daily   • levothyroxine (Synthroid, Levoxyl) 125 mcg tablet TAKE 1 TABLET BY MOUTH ONCE DAILY   •  omeprazole (PRILOSEC) 40 mg, oral, Daily before breakfast, Do not crush or chew.       Allergies  Patient has no known allergies.    Review of Systems   Constitutional:  Negative for fatigue, fever and unexpected weight change.   HENT:  Negative for trouble swallowing.    Eyes:  Negative for visual disturbance.   Respiratory:  Negative for shortness of breath.    Cardiovascular:  Negative for chest pain and palpitations.   Gastrointestinal:  Negative for abdominal pain, constipation, diarrhea, nausea and vomiting.   Endocrine: Negative for cold intolerance and heat intolerance.   Musculoskeletal:  Negative for neck pain.   Skin:  Negative for rash.   Neurological:  Negative for tremors, weakness and headaches.   Psychiatric/Behavioral:  The patient is not nervous/anxious.          Last Recorded Vitals  Weight 59 kg (130 lb).    Physical Exam  Constitutional:       General: She is not in acute distress.  HENT:      Head: Normocephalic.      Mouth/Throat:      Mouth: Mucous membranes are moist.   Eyes:      Extraocular Movements: Extraocular movements intact.   Neck:      Comments: Thyroidectomy scar, no palpable gland  Cardiovascular:      Pulses:           Radial pulses are 2+ on the right side and 2+ on the left side.   Musculoskeletal:      Right lower leg: No edema.      Left lower leg: No edema.   Lymphadenopathy:      Cervical: No cervical adenopathy.   Neurological:      Mental Status: She is alert.      Motor: No tremor.   Psychiatric:         Mood and Affect: Affect normal.       I personally performed real-time ultrasound examination of the anterior neck.  See procedure report in [facility].       Relevant Results   Latest Reference Range & Units 06/13/25 12:08   GLUCOSE 65 - 139 mg/dL 92   SODIUM 135 - 146 mmol/L 139   POTASSIUM 3.5 - 5.3 mmol/L 4.2   CHLORIDE 98 - 110 mmol/L 103   CARBON DIOXIDE 20 - 32 mmol/L 25   ELECTROLYTE BALANCE 7 - 17 mmol/L (calc) 11   UREA NITROGEN (BUN) 7 - 25 mg/dL 14   CREATININE  0.50 - 1.03 mg/dL 0.54   EGFR > OR = 60 mL/min/1.73m2 109   BUN/CREATININE RATIO 6 - 22 (calc) SEE NOTE:   CALCIUM 8.6 - 10.4 mg/dL 9.3   TSH mIU/L 0.16 (L)   VITAMIN D,25-OH,TOTAL,IA 30 - 100 ng/mL 32   THYROGLOBULIN ANTIBODIES  COMMENT ONLY (P)           IMPRESSION  PAPILLARY THYROID CARCINOMA  No ultrasound evidence of persistent disease at 5 years  Thyroglobulin pending  TSH suppressed    VITAMIN D DEFICIENCY  Vitamin D concentration acceptable on current supplementation      RECOMMENDATIONS  Thyroglobulin Results available on [employer]  Call/message if you have not received results in 7 days.     Decrease levothyroxine to 112 mcg/day  Take levothyroxine on an empty stomach with water alone, 1 hour before eating or taking other medications, 4 hours before any calcium or iron supplement.    Follow up 5-6 months  Repeat TSH before next appointment    Next routine ultrasound in 2027    Continue vitamin D 1000 units/day            [1]  Family History  Problem Relation Name Age of Onset   • Diabetes Mother     • Thyroid disease Mother     • Heart disease Father Dad    • Diabetes Father Dad    • Bone cancer Father Dad    • Arthritis Father Dad    • Hyperthyroidism Brother Kristian    • Squamous cell carcinoma Brother Kristian    • Thyroid cancer Cousin     • Cancer Maternal Grandfather Grandfather         [1]  Family History  Problem Relation Name Age of Onset   • Diabetes Mother     • Thyroid disease Mother     • Heart disease Father Dad    • Diabetes Father Dad    • Bone cancer Father Dad    • Arthritis Father Dad    • Hyperthyroidism Brother Kristian    • Squamous cell carcinoma Brother Kristian    • Thyroid cancer Cousin     • Cancer Maternal Grandfather Grandfather

## 2025-06-17 ENCOUNTER — PHARMACY VISIT (OUTPATIENT)
Dept: PHARMACY | Facility: CLINIC | Age: 55
End: 2025-06-17
Payer: COMMERCIAL

## 2025-06-18 LAB
25(OH)D3+25(OH)D2 SERPL-MCNC: 32 NG/ML (ref 30–100)
ANION GAP SERPL CALCULATED.4IONS-SCNC: 11 MMOL/L (CALC) (ref 7–17)
BUN SERPL-MCNC: 14 MG/DL (ref 7–25)
BUN/CREAT SERPL: NORMAL (CALC) (ref 6–22)
CALCIUM SERPL-MCNC: 9.3 MG/DL (ref 8.6–10.4)
CHLORIDE SERPL-SCNC: 103 MMOL/L (ref 98–110)
CO2 SERPL-SCNC: 25 MMOL/L (ref 20–32)
CREAT SERPL-MCNC: 0.54 MG/DL (ref 0.5–1.03)
EGFRCR SERPLBLD CKD-EPI 2021: 109 ML/MIN/1.73M2
GLUCOSE SERPL-MCNC: 92 MG/DL (ref 65–139)
POTASSIUM SERPL-SCNC: 4.2 MMOL/L (ref 3.5–5.3)
SODIUM SERPL-SCNC: 139 MMOL/L (ref 135–146)
THYROGLOB AB SERPL-ACNC: <1 IU/ML
THYROGLOB SERPL-MCNC: <0.1 NG/ML
TSH SERPL-ACNC: 0.16 MIU/L

## 2025-06-30 DIAGNOSIS — I10 HYPERTENSION, UNSPECIFIED TYPE: ICD-10-CM

## 2025-06-30 RX ORDER — CARVEDILOL 12.5 MG/1
12.5 TABLET ORAL 2 TIMES DAILY
Qty: 180 TABLET | Refills: 1 | Status: SHIPPED | OUTPATIENT
Start: 2025-06-30

## 2025-07-05 ASSESSMENT — ENCOUNTER SYMPTOMS
FEVER: 0
FATIGUE: 0
HEADACHES: 0
UNEXPECTED WEIGHT CHANGE: 0
NECK PAIN: 0
PALPITATIONS: 0
TROUBLE SWALLOWING: 0
NERVOUS/ANXIOUS: 0
ABDOMINAL PAIN: 0
DIARRHEA: 0
WEAKNESS: 0
VOMITING: 0
NAUSEA: 0
SHORTNESS OF BREATH: 0
TREMORS: 0
CONSTIPATION: 0

## 2025-07-10 DIAGNOSIS — K21.9 GERD WITHOUT ESOPHAGITIS: ICD-10-CM

## 2025-07-10 RX ORDER — OMEPRAZOLE 40 MG/1
40 CAPSULE, DELAYED RELEASE ORAL
Qty: 90 CAPSULE | Refills: 0 | Status: SHIPPED | OUTPATIENT
Start: 2025-07-10

## 2025-07-23 PROCEDURE — RXMED WILLOW AMBULATORY MEDICATION CHARGE

## 2025-07-24 ENCOUNTER — PHARMACY VISIT (OUTPATIENT)
Dept: PHARMACY | Facility: CLINIC | Age: 55
End: 2025-07-24
Payer: COMMERCIAL

## 2025-08-13 ENCOUNTER — APPOINTMENT (OUTPATIENT)
Dept: DERMATOLOGY | Facility: CLINIC | Age: 55
End: 2025-08-13
Payer: COMMERCIAL

## 2025-08-13 DIAGNOSIS — D48.5 NEOPLASM OF UNCERTAIN BEHAVIOR OF SKIN: Primary | ICD-10-CM

## 2025-08-13 PROCEDURE — 11102 TANGNTL BX SKIN SINGLE LES: CPT | Performed by: NURSE PRACTITIONER

## 2025-08-15 LAB
LABORATORY COMMENT REPORT: NORMAL
PATH REPORT.FINAL DX SPEC: NORMAL
PATH REPORT.GROSS SPEC: NORMAL
PATH REPORT.RELEVANT HX SPEC: NORMAL
PATH REPORT.TOTAL CANCER: NORMAL

## 2025-08-19 DIAGNOSIS — D48.5 NEOPLASM OF UNCERTAIN BEHAVIOR OF SKIN OF TRUNK: ICD-10-CM

## 2025-11-10 ENCOUNTER — APPOINTMENT (OUTPATIENT)
Dept: ENDOCRINOLOGY | Facility: CLINIC | Age: 55
End: 2025-11-10
Payer: COMMERCIAL

## 2025-12-03 ENCOUNTER — APPOINTMENT (OUTPATIENT)
Dept: DERMATOLOGY | Facility: CLINIC | Age: 55
End: 2025-12-03
Payer: COMMERCIAL

## 2026-01-30 ENCOUNTER — APPOINTMENT (OUTPATIENT)
Dept: DERMATOLOGY | Facility: CLINIC | Age: 56
End: 2026-01-30
Payer: COMMERCIAL

## (undated) DEVICE — SOLUTION, CHLORHEXIDINE, 4%, 4OZ

## (undated) DEVICE — SUTURE, PROLENE 4-0, 18IN, PS2, BLUE MONO

## (undated) DEVICE — DRESSING, NON-ADHERENT, OIL EMULSION, CURITY, 3 X 16 IN, STERILE

## (undated) DEVICE — TRAY, DRY PREP, PREMIUM

## (undated) DEVICE — BANDAGE, ELASTIC, ACE, SELF-CLOSURE, 2 IN X 5 YD, STERILE

## (undated) DEVICE — Device

## (undated) DEVICE — CUFF, TOURNIQUET 18 DUAL PORT/SNGL BLAD"

## (undated) DEVICE — SPONGE, GAUZE, XRAY DECT, 16 PLY, 4 X 4, W/MASTER DMT,STERILE

## (undated) DEVICE — SOLUTION, IRRIGATION, 0.9% SODIUM CHLORIDE, 1000 ML, HANG BOTTLE

## (undated) DEVICE — GLOVE, SURGICAL, PROTEXIS,  7.5, PF, LATEX